# Patient Record
Sex: FEMALE | Race: WHITE | NOT HISPANIC OR LATINO | URBAN - NONMETROPOLITAN AREA
[De-identification: names, ages, dates, MRNs, and addresses within clinical notes are randomized per-mention and may not be internally consistent; named-entity substitution may affect disease eponyms.]

---

## 2017-02-01 ENCOUNTER — DOCTOR'S OFFICE (OUTPATIENT)
Dept: URBAN - NONMETROPOLITAN AREA CLINIC 1 | Facility: CLINIC | Age: 52
Setting detail: OPHTHALMOLOGY
End: 2017-02-01
Payer: COMMERCIAL

## 2017-02-01 DIAGNOSIS — H43.11: ICD-10-CM

## 2017-02-01 DIAGNOSIS — H33.301: ICD-10-CM

## 2017-02-01 DIAGNOSIS — H33.021: ICD-10-CM

## 2017-02-01 DIAGNOSIS — H35.413: ICD-10-CM

## 2017-02-01 DIAGNOSIS — H53.19: ICD-10-CM

## 2017-02-01 DIAGNOSIS — H43.813: ICD-10-CM

## 2017-02-01 PROCEDURE — 92014 COMPRE OPH EXAM EST PT 1/>: CPT | Performed by: OPHTHALMOLOGY

## 2017-02-01 ASSESSMENT — REFRACTION_MANIFEST
OD_VA1: 20/
OS_VA3: 20/
OU_VA: 20/
OD_VA1: 20/
OS_VA1: 20/
OS_VA2: 20/
OS_VA1: 20/
OU_VA: 20/
OD_VA2: 20/
OD_VA3: 20/
OD_VA3: 20/
OD_VA2: 20/
OD_VA3: 20/
OS_VA3: 20/
OD_VA1: 20/
OS_VA2: 20/
OS_VA1: 20/
OS_VA2: 20/
OD_VA2: 20/
OU_VA: 20/
OS_VA3: 20/

## 2017-02-01 ASSESSMENT — REFRACTION_AUTOREFRACTION
OD_AXIS: 120
OD_SPHERE: -4.50
OS_AXIS: 94
OS_CYLINDER: -0.50
OS_SPHERE: -3.25
OD_CYLINDER: -0.25

## 2017-02-01 ASSESSMENT — REFRACTION_CURRENTRX
OD_OVR_VA: 20/
OD_OVR_VA: 20/
OS_OVR_VA: 20/
OD_OVR_VA: 20/
OS_OVR_VA: 20/
OS_OVR_VA: 20/

## 2017-02-01 ASSESSMENT — VISUAL ACUITY
OD_BCVA: 20/25
OS_BCVA: 20/25

## 2017-02-01 ASSESSMENT — CONFRONTATIONAL VISUAL FIELD TEST (CVF)
OS_FINDINGS: FULL
OD_FINDINGS: FULL

## 2017-02-01 ASSESSMENT — SPHEQUIV_DERIVED
OD_SPHEQUIV: -4.625
OS_SPHEQUIV: -3.5

## 2017-04-12 ENCOUNTER — DOCTOR'S OFFICE (OUTPATIENT)
Dept: URBAN - NONMETROPOLITAN AREA CLINIC 1 | Facility: CLINIC | Age: 52
Setting detail: OPHTHALMOLOGY
End: 2017-04-12
Payer: COMMERCIAL

## 2017-04-12 DIAGNOSIS — H52.13: ICD-10-CM

## 2017-04-12 DIAGNOSIS — H52.4: ICD-10-CM

## 2017-04-12 PROCEDURE — 92015 DETERMINE REFRACTIVE STATE: CPT | Performed by: OPTOMETRIST

## 2017-04-12 ASSESSMENT — REFRACTION_OUTSIDERX
OD_SPHERE: -2.75
OS_VA2: 20/20-2
OD_CYLINDER: SPH
OU_VA: 20/
OD_ADD: +1.25
OS_ADD: +1.25
OS_VA3: 20/
OD_CYLINDER: SPH
OD_VA1: 20/25
OS_SPHERE: -1.50
OS_SPHERE: -2.75
OS_AXIS: 090
OS_VA3: 20/
OS_VA2: 20/
OD_VA1: 20/
OS_VA1: 20/
OD_VA3: 20/
OD_VA2: 20/
OD_SPHERE: -4.00
OS_CYLINDER: -0.50
OS_VA1: 20/20-2
OS_CYLINDER: -0.50
OD_ADD: +2.50
OS_ADD: +2.50
OD_VA2: 20/25
OD_VA3: 20/
OS_AXIS: 090
OU_VA: 20/

## 2017-04-12 ASSESSMENT — REFRACTION_CURRENTRX
OS_AXIS: 116
OD_OVR_VA: 20/
OS_SPHERE: -2.75
OD_ADD: +1.25
OD_OVR_VA: 20/
OS_OVR_VA: 20/
OD_SPHERE: -4.00
OS_OVR_VA: 20/
OS_OVR_VA: 20/
OS_ADD: +1.25
OS_CYLINDER: -0.50
OD_OVR_VA: 20/
OD_AXIS: 045
OD_CYLINDER: -0.50

## 2017-04-12 ASSESSMENT — REFRACTION_MANIFEST
OD_VA3: 20/
OD_VA2: 20/
OU_VA: 20/
OS_VA3: 20/
OS_VA2: 20/
OS_VA1: 20/
OD_VA1: 20/

## 2017-04-12 ASSESSMENT — REFRACTION_AUTOREFRACTION
OD_AXIS: 000
OS_CYLINDER: -0.50
OS_SPHERE: -2.75
OS_AXIS: 088
OD_SPHERE: -4.00
OD_CYLINDER: 0.00

## 2017-04-12 ASSESSMENT — SPHEQUIV_DERIVED
OD_SPHEQUIV: -4
OS_SPHEQUIV: -3

## 2017-04-12 ASSESSMENT — VISUAL ACUITY
OD_BCVA: 20/20-1
OS_BCVA: 20/25-1

## 2017-05-03 ENCOUNTER — DOCTOR'S OFFICE (OUTPATIENT)
Dept: URBAN - NONMETROPOLITAN AREA CLINIC 1 | Facility: CLINIC | Age: 52
Setting detail: OPHTHALMOLOGY
End: 2017-05-03
Payer: COMMERCIAL

## 2017-05-03 DIAGNOSIS — H33.301: ICD-10-CM

## 2017-05-03 DIAGNOSIS — H35.413: ICD-10-CM

## 2017-05-03 DIAGNOSIS — H25.13: ICD-10-CM

## 2017-05-03 DIAGNOSIS — H33.021: ICD-10-CM

## 2017-05-03 DIAGNOSIS — H43.813: ICD-10-CM

## 2017-05-03 DIAGNOSIS — H43.11: ICD-10-CM

## 2017-05-03 DIAGNOSIS — H53.19: ICD-10-CM

## 2017-05-03 PROCEDURE — 92014 COMPRE OPH EXAM EST PT 1/>: CPT | Performed by: OPHTHALMOLOGY

## 2017-05-03 ASSESSMENT — REFRACTION_OUTSIDERX
OD_CYLINDER: SPH
OS_AXIS: 090
OD_ADD: +2.50
OS_VA1: 20/
OD_VA1: 20/
OS_ADD: +2.50
OD_VA3: 20/
OD_VA2: 20/
OS_VA3: 20/
OS_VA2: 20/20-2
OD_SPHERE: -2.75
OS_VA3: 20/
OS_SPHERE: -2.75
OD_SPHERE: -4.00
OS_SPHERE: -1.50
OS_ADD: +1.25
OD_ADD: +1.25
OS_VA2: 20/
OU_VA: 20/
OD_VA2: 20/25
OS_CYLINDER: -0.50
OS_VA1: 20/20-2
OS_CYLINDER: -0.50
OU_VA: 20/
OS_AXIS: 090
OD_VA1: 20/25
OD_CYLINDER: SPH
OD_VA3: 20/

## 2017-05-03 ASSESSMENT — REFRACTION_AUTOREFRACTION
OS_SPHERE: -2.75
OD_SPHERE: -4.00
OD_CYLINDER: 0.00
OS_AXIS: 088
OS_CYLINDER: -0.50
OD_AXIS: 000

## 2017-05-03 ASSESSMENT — CONFRONTATIONAL VISUAL FIELD TEST (CVF)
OD_FINDINGS: FULL
OS_FINDINGS: FULL

## 2017-05-03 ASSESSMENT — REFRACTION_MANIFEST
OD_VA1: 20/
OD_VA3: 20/
OS_VA2: 20/
OD_VA2: 20/
OS_VA3: 20/
OU_VA: 20/
OS_VA1: 20/

## 2017-05-03 ASSESSMENT — REFRACTION_CURRENTRX
OD_OVR_VA: 20/
OD_OVR_VA: 20/
OD_ADD: +1.25
OD_CYLINDER: -0.50
OD_SPHERE: -4.00
OS_OVR_VA: 20/
OD_OVR_VA: 20/
OS_OVR_VA: 20/
OS_AXIS: 116
OS_OVR_VA: 20/
OS_ADD: +1.25
OS_SPHERE: -2.75
OS_CYLINDER: -0.50
OD_AXIS: 045

## 2017-05-03 ASSESSMENT — VISUAL ACUITY
OS_BCVA: 20/25-1
OD_BCVA: 20/20-1

## 2017-05-03 ASSESSMENT — SPHEQUIV_DERIVED
OD_SPHEQUIV: -4
OS_SPHEQUIV: -3

## 2017-08-24 ENCOUNTER — OPTICAL OFFICE (OUTPATIENT)
Dept: URBAN - NONMETROPOLITAN AREA CLINIC 4 | Facility: CLINIC | Age: 52
Setting detail: OPHTHALMOLOGY
End: 2017-08-24
Payer: COMMERCIAL

## 2017-08-24 DIAGNOSIS — H52.13: ICD-10-CM

## 2017-08-24 PROCEDURE — V2020 VISION SVCS FRAMES PURCHASES: HCPCS | Performed by: OPTOMETRIST

## 2017-08-24 PROCEDURE — V2781 PROGRESSIVE LENS PER LENS: HCPCS | Performed by: OPTOMETRIST

## 2017-08-24 PROCEDURE — V2025 EYEGLASSES DELUX FRAMES: HCPCS | Performed by: OPTOMETRIST

## 2017-08-24 PROCEDURE — V2784 LENS POLYCARB OR EQUAL: HCPCS | Performed by: OPTOMETRIST

## 2017-08-24 PROCEDURE — V2744 TINT PHOTOCHROMATIC LENS/ES: HCPCS | Performed by: OPTOMETRIST

## 2017-08-24 PROCEDURE — V2203 LENS SPHCYL BIFOCAL 4.00D/.1: HCPCS | Performed by: OPTOMETRIST

## 2017-08-24 PROCEDURE — V2750 ANTI-REFLECTIVE COATING: HCPCS | Performed by: OPTOMETRIST

## 2017-10-02 ENCOUNTER — DOCTOR'S OFFICE (OUTPATIENT)
Dept: URBAN - NONMETROPOLITAN AREA CLINIC 1 | Facility: CLINIC | Age: 52
Setting detail: OPHTHALMOLOGY
End: 2017-10-02
Payer: COMMERCIAL

## 2017-10-02 DIAGNOSIS — H43.811: ICD-10-CM

## 2017-10-02 DIAGNOSIS — H43.812: ICD-10-CM

## 2017-10-02 DIAGNOSIS — H53.19: ICD-10-CM

## 2017-10-02 DIAGNOSIS — H35.413: ICD-10-CM

## 2017-10-02 DIAGNOSIS — H43.813: ICD-10-CM

## 2017-10-02 DIAGNOSIS — H33.021: ICD-10-CM

## 2017-10-02 DIAGNOSIS — H33.301: ICD-10-CM

## 2017-10-02 PROBLEM — H43.11: Status: RESOLVED | Noted: 2017-02-01 | Resolved: 2017-10-02

## 2017-10-02 PROCEDURE — 92134 CPTRZ OPH DX IMG PST SGM RTA: CPT | Performed by: OPHTHALMOLOGY

## 2017-10-02 PROCEDURE — 92226 OPHTHALMOSCOPY EXT SUBSEQUENT: CPT | Performed by: OPHTHALMOLOGY

## 2017-10-02 PROCEDURE — 92014 COMPRE OPH EXAM EST PT 1/>: CPT | Performed by: OPHTHALMOLOGY

## 2017-10-02 ASSESSMENT — REFRACTION_OUTSIDERX
OS_ADD: +1.25
OS_VA1: 20/20-2
OD_VA2: 20/25
OS_CYLINDER: -0.50
OD_SPHERE: -4.00
OD_VA1: 20/25
OD_VA3: 20/
OD_CYLINDER: SPH
OD_VA1: 20/
OS_VA3: 20/
OD_ADD: +2.50
OS_VA3: 20/
OU_VA: 20/
OS_VA1: 20/
OS_SPHERE: -1.50
OD_VA3: 20/
OD_CYLINDER: SPH
OS_SPHERE: -2.75
OS_AXIS: 090
OD_VA2: 20/
OD_ADD: +1.25
OS_CYLINDER: -0.50
OS_VA2: 20/
OS_AXIS: 090
OD_SPHERE: -2.75
OU_VA: 20/
OS_VA2: 20/20-2
OS_ADD: +2.50

## 2017-10-02 ASSESSMENT — CONFRONTATIONAL VISUAL FIELD TEST (CVF)
OS_FINDINGS: FULL
OD_FINDINGS: FULL

## 2017-10-02 ASSESSMENT — REFRACTION_CURRENTRX
OD_ADD: +1.25
OS_AXIS: 116
OS_ADD: +1.25
OS_OVR_VA: 20/
OS_CYLINDER: -0.50
OS_OVR_VA: 20/
OD_SPHERE: -4.00
OS_SPHERE: -2.75
OS_OVR_VA: 20/
OD_AXIS: 045
OD_OVR_VA: 20/
OD_OVR_VA: 20/
OD_CYLINDER: -0.50
OD_OVR_VA: 20/

## 2017-10-02 ASSESSMENT — REFRACTION_MANIFEST
OD_VA2: 20/
OU_VA: 20/
OS_VA2: 20/
OS_VA3: 20/
OD_VA3: 20/
OS_VA1: 20/
OD_VA1: 20/

## 2017-10-02 ASSESSMENT — VISUAL ACUITY
OD_BCVA: 20/30
OS_BCVA: 20/25

## 2017-10-02 ASSESSMENT — REFRACTION_AUTOREFRACTION
OD_CYLINDER: 0.00
OS_AXIS: 088
OS_SPHERE: -2.75
OD_AXIS: 000
OD_SPHERE: -4.00
OS_CYLINDER: -0.50

## 2017-10-02 ASSESSMENT — SPHEQUIV_DERIVED
OD_SPHEQUIV: -4
OS_SPHEQUIV: -3

## 2017-10-11 ENCOUNTER — DOCTOR'S OFFICE (OUTPATIENT)
Dept: URBAN - NONMETROPOLITAN AREA CLINIC 1 | Facility: CLINIC | Age: 52
Setting detail: OPHTHALMOLOGY
End: 2017-10-11
Payer: COMMERCIAL

## 2017-10-11 DIAGNOSIS — H52.13: ICD-10-CM

## 2017-10-11 DIAGNOSIS — H52.4: ICD-10-CM

## 2017-10-11 PROCEDURE — 92015 DETERMINE REFRACTIVE STATE: CPT | Performed by: OPTOMETRIST

## 2017-10-11 PROCEDURE — 92310 CONTACT LENS FITTING OU: CPT | Performed by: OPTOMETRIST

## 2017-10-11 ASSESSMENT — REFRACTION_CURRENTRX
OS_ADD: +1.25
OD_AXIS: 41
OS_ADD: +2.50
OD_OVR_VA: 20/
OD_OVR_VA: 20/
OD_AXIS: 93
OD_ADD: +2.50
OS_SPHERE: -2.75
OS_AXIS: 86
OD_ADD: +1.25
OD_VPRISM_DIRECTION: PROGS
OD_CYLINDER: -0.25
OS_VPRISM_DIRECTION: PROGS
OD_VPRISM_DIRECTION: PROGS
OS_OVR_VA: 20/
OD_OVR_VA: 20/
OD_SPHERE: -4.50
OD_CYLINDER: -0.25
OS_SPHERE: -3.00
OS_CYLINDER: -0.50
OS_OVR_VA: 20/
OS_OVR_VA: 20/
OS_CYLINDER: -0.75
OD_SPHERE: -4.00
OS_VPRISM_DIRECTION: PROGS
OS_AXIS: 98

## 2017-10-11 ASSESSMENT — REFRACTION_OUTSIDERX
OS_CYLINDER: -0.50
OS_AXIS: 090
OS_AXIS: 090
OD_CYLINDER: SPH
OU_VA: 20/
OS_SPHERE: -1.50
OS_ADD: +2.50
OD_VA2: 20/25
OS_VA1: 20/
OD_ADD: +1.25
OD_CYLINDER: SPH
OD_ADD: +2.50
OD_VA1: 20/
OD_VA2: 20/
OD_SPHERE: -2.75
OS_VA3: 20/
OD_VA1: 20/25
OD_VA3: 20/
OD_VA3: 20/
OS_SPHERE: -2.75
OS_ADD: +1.25
OD_SPHERE: -4.00
OS_VA2: 20/20-2
OS_VA1: 20/20-2
OS_CYLINDER: -0.50
OS_VA2: 20/
OU_VA: 20/
OS_VA3: 20/

## 2017-10-11 ASSESSMENT — CONFRONTATIONAL VISUAL FIELD TEST (CVF)
OS_FINDINGS: FULL
OD_FINDINGS: FULL

## 2017-10-11 ASSESSMENT — VISUAL ACUITY
OS_BCVA: 20/25
OD_BCVA: 20/20-1

## 2017-10-11 ASSESSMENT — REFRACTION_AUTOREFRACTION
OD_SPHERE: -4.00
OS_SPHERE: -2.75
OS_AXIS: 088
OS_CYLINDER: -0.50

## 2017-10-11 ASSESSMENT — REFRACTION_MANIFEST
OS_VA1: 20/
OD_VA3: 20/
OS_VA2: 20/
OS_VA3: 20/
OU_VA: 20/
OD_VA1: 20/
OD_VA2: 20/

## 2017-10-11 ASSESSMENT — SPHEQUIV_DERIVED: OS_SPHEQUIV: -3

## 2017-12-04 ENCOUNTER — DOCTOR'S OFFICE (OUTPATIENT)
Dept: URBAN - NONMETROPOLITAN AREA CLINIC 1 | Facility: CLINIC | Age: 52
Setting detail: OPHTHALMOLOGY
End: 2017-12-04
Payer: COMMERCIAL

## 2017-12-04 DIAGNOSIS — H33.021: ICD-10-CM

## 2017-12-04 DIAGNOSIS — H43.813: ICD-10-CM

## 2017-12-04 DIAGNOSIS — H35.413: ICD-10-CM

## 2017-12-04 DIAGNOSIS — H25.13: ICD-10-CM

## 2017-12-04 DIAGNOSIS — H43.811: ICD-10-CM

## 2017-12-04 DIAGNOSIS — H43.812: ICD-10-CM

## 2017-12-04 DIAGNOSIS — H33.301: ICD-10-CM

## 2017-12-04 DIAGNOSIS — H53.19: ICD-10-CM

## 2017-12-04 PROCEDURE — 92226 OPHTHALMOSCOPY EXT SUBSEQUENT: CPT | Performed by: OPHTHALMOLOGY

## 2017-12-04 PROCEDURE — 92014 COMPRE OPH EXAM EST PT 1/>: CPT | Performed by: OPHTHALMOLOGY

## 2017-12-04 PROCEDURE — 92134 CPTRZ OPH DX IMG PST SGM RTA: CPT | Performed by: OPHTHALMOLOGY

## 2017-12-04 ASSESSMENT — REFRACTION_CURRENTRX
OD_OVR_VA: 20/
OD_AXIS: 41
OD_AXIS: 93
OS_AXIS: 86
OD_CYLINDER: -0.25
OS_OVR_VA: 20/
OS_VPRISM_DIRECTION: PROGS
OS_OVR_VA: 20/
OS_ADD: +2.50
OS_CYLINDER: -0.50
OD_ADD: +2.50
OD_OVR_VA: 20/
OS_SPHERE: -3.00
OD_CYLINDER: -0.25
OS_OVR_VA: 20/
OS_ADD: +1.25
OD_OVR_VA: 20/
OS_VPRISM_DIRECTION: PROGS
OD_SPHERE: -4.00
OS_SPHERE: -2.75
OD_SPHERE: -4.50
OS_CYLINDER: -0.75
OD_VPRISM_DIRECTION: PROGS
OS_AXIS: 98
OD_VPRISM_DIRECTION: PROGS
OD_ADD: +1.25

## 2017-12-04 ASSESSMENT — CONFRONTATIONAL VISUAL FIELD TEST (CVF)
OD_FINDINGS: FULL
OS_FINDINGS: FULL

## 2017-12-04 ASSESSMENT — REFRACTION_OUTSIDERX
OU_VA: 20/
OS_AXIS: 090
OD_SPHERE: -4.00
OD_VA1: 20/
OD_SPHERE: -2.75
OD_CYLINDER: SPH
OS_VA2: 20/
OD_VA3: 20/
OS_ADD: +1.25
OS_CYLINDER: -0.50
OD_ADD: +2.50
OD_VA3: 20/
OD_CYLINDER: SPH
OS_AXIS: 090
OS_SPHERE: -1.50
OS_VA3: 20/
OD_ADD: +1.25
OS_VA2: 20/20-2
OD_VA1: 20/25
OS_SPHERE: -2.75
OS_ADD: +2.50
OS_VA1: 20/20-2
OD_VA2: 20/
OS_VA3: 20/
OU_VA: 20/
OS_CYLINDER: -0.50
OD_VA2: 20/25
OS_VA1: 20/

## 2017-12-04 ASSESSMENT — REFRACTION_MANIFEST
OS_VA2: 20/
OD_VA3: 20/
OS_VA1: 20/
OD_VA2: 20/
OD_VA1: 20/
OU_VA: 20/
OS_VA3: 20/

## 2017-12-04 ASSESSMENT — REFRACTION_AUTOREFRACTION
OS_CYLINDER: -0.50
OS_AXIS: 088
OS_SPHERE: -2.75
OD_SPHERE: -4.00

## 2017-12-04 ASSESSMENT — VISUAL ACUITY
OS_BCVA: 20/30+1
OD_BCVA: 20/25+1

## 2017-12-04 ASSESSMENT — SPHEQUIV_DERIVED: OS_SPHEQUIV: -3

## 2018-02-05 ENCOUNTER — DOCTOR'S OFFICE (OUTPATIENT)
Dept: URBAN - NONMETROPOLITAN AREA CLINIC 1 | Facility: CLINIC | Age: 53
Setting detail: OPHTHALMOLOGY
End: 2018-02-05
Payer: COMMERCIAL

## 2018-02-05 DIAGNOSIS — H43.811: ICD-10-CM

## 2018-02-05 DIAGNOSIS — H04.123: ICD-10-CM

## 2018-02-05 DIAGNOSIS — H25.13: ICD-10-CM

## 2018-02-05 DIAGNOSIS — H35.413: ICD-10-CM

## 2018-02-05 DIAGNOSIS — H33.021: ICD-10-CM

## 2018-02-05 DIAGNOSIS — H43.812: ICD-10-CM

## 2018-02-05 DIAGNOSIS — H53.19: ICD-10-CM

## 2018-02-05 DIAGNOSIS — H33.301: ICD-10-CM

## 2018-02-05 DIAGNOSIS — H43.813: ICD-10-CM

## 2018-02-05 PROCEDURE — 92226 OPHTHALMOSCOPY EXT SUBSEQUENT: CPT | Performed by: OPHTHALMOLOGY

## 2018-02-05 PROCEDURE — 92014 COMPRE OPH EXAM EST PT 1/>: CPT | Performed by: OPHTHALMOLOGY

## 2018-02-05 PROCEDURE — 92134 CPTRZ OPH DX IMG PST SGM RTA: CPT | Performed by: OPHTHALMOLOGY

## 2018-02-05 ASSESSMENT — SPHEQUIV_DERIVED: OS_SPHEQUIV: -3

## 2018-02-05 ASSESSMENT — REFRACTION_OUTSIDERX
OD_CYLINDER: SPH
OD_ADD: +1.25
OU_VA: 20/
OD_VA3: 20/
OS_AXIS: 090
OD_CYLINDER: SPH
OS_VA1: 20/
OS_VA1: 20/20-2
OS_CYLINDER: -0.50
OS_ADD: +1.25
OS_VA2: 20/20-2
OS_VA2: 20/
OD_VA2: 20/
OS_AXIS: 090
OS_VA3: 20/
OU_VA: 20/
OD_VA3: 20/
OS_VA3: 20/
OD_VA2: 20/25
OS_CYLINDER: -0.50
OS_SPHERE: -1.50
OS_ADD: +2.50
OD_ADD: +2.50
OD_SPHERE: -4.00
OD_SPHERE: -2.75
OD_VA1: 20/
OS_SPHERE: -2.75
OD_VA1: 20/25

## 2018-02-05 ASSESSMENT — REFRACTION_CURRENTRX
OD_CYLINDER: -0.25
OS_CYLINDER: -0.75
OD_VPRISM_DIRECTION: PROGS
OS_OVR_VA: 20/
OD_OVR_VA: 20/
OD_ADD: +2.50
OS_ADD: +1.25
OS_CYLINDER: -0.50
OD_OVR_VA: 20/
OS_ADD: +2.50
OS_OVR_VA: 20/
OD_SPHERE: -4.00
OS_AXIS: 98
OS_VPRISM_DIRECTION: PROGS
OD_SPHERE: -4.50
OD_OVR_VA: 20/
OD_AXIS: 41
OD_ADD: +1.25
OS_SPHERE: -2.75
OS_AXIS: 86
OD_CYLINDER: -0.25
OD_VPRISM_DIRECTION: PROGS
OS_OVR_VA: 20/
OS_SPHERE: -3.00
OS_VPRISM_DIRECTION: PROGS
OD_AXIS: 93

## 2018-02-05 ASSESSMENT — REFRACTION_AUTOREFRACTION
OS_AXIS: 088
OS_SPHERE: -2.75
OD_SPHERE: -4.00
OS_CYLINDER: -0.50

## 2018-02-05 ASSESSMENT — REFRACTION_MANIFEST
OS_VA2: 20/
OS_VA1: 20/
OD_VA3: 20/
OU_VA: 20/
OD_VA1: 20/
OD_VA2: 20/
OS_VA3: 20/

## 2018-02-05 ASSESSMENT — DECREASING TEAR LAKE - SEVERITY SCORE
OS_DEC_TEARLAKE: 2+ 3+
OD_DEC_TEARLAKE: 2+ 3+

## 2018-02-05 ASSESSMENT — VISUAL ACUITY
OD_BCVA: 20/25
OS_BCVA: 20/25

## 2018-02-05 ASSESSMENT — CONFRONTATIONAL VISUAL FIELD TEST (CVF)
OS_FINDINGS: FULL
OD_FINDINGS: FULL

## 2018-02-20 ENCOUNTER — DOCTOR'S OFFICE (OUTPATIENT)
Dept: URBAN - NONMETROPOLITAN AREA CLINIC 2 | Facility: CLINIC | Age: 53
Setting detail: OPHTHALMOLOGY
End: 2018-02-20
Payer: COMMERCIAL

## 2018-02-20 DIAGNOSIS — H53.19: ICD-10-CM

## 2018-02-20 DIAGNOSIS — H35.373: ICD-10-CM

## 2018-02-20 DIAGNOSIS — H43.811: ICD-10-CM

## 2018-02-20 DIAGNOSIS — H43.812: ICD-10-CM

## 2018-02-20 DIAGNOSIS — H33.021: ICD-10-CM

## 2018-02-20 DIAGNOSIS — H43.813: ICD-10-CM

## 2018-02-20 DIAGNOSIS — H33.301: ICD-10-CM

## 2018-02-20 DIAGNOSIS — H35.413: ICD-10-CM

## 2018-02-20 PROCEDURE — 92014 COMPRE OPH EXAM EST PT 1/>: CPT | Performed by: OPHTHALMOLOGY

## 2018-02-20 PROCEDURE — 92226 OPHTHALMOSCOPY EXT SUBSEQUENT: CPT | Performed by: OPHTHALMOLOGY

## 2018-02-20 ASSESSMENT — REFRACTION_OUTSIDERX
OD_ADD: +1.25
OD_ADD: +2.50
OS_AXIS: 090
OU_VA: 20/
OS_AXIS: 090
OS_CYLINDER: -0.50
OD_VA2: 20/
OD_CYLINDER: SPH
OS_ADD: +1.25
OS_VA2: 20/
OS_SPHERE: -2.75
OS_VA2: 20/20-2
OD_CYLINDER: SPH
OU_VA: 20/
OS_SPHERE: -1.50
OD_VA1: 20/
OS_VA1: 20/20-2
OD_SPHERE: -2.75
OD_VA3: 20/
OD_SPHERE: -4.00
OS_VA3: 20/
OS_CYLINDER: -0.50
OS_VA3: 20/
OS_VA1: 20/
OD_VA1: 20/25
OS_ADD: +2.50
OD_VA2: 20/25
OD_VA3: 20/

## 2018-02-20 ASSESSMENT — REFRACTION_CURRENTRX
OS_OVR_VA: 20/
OD_OVR_VA: 20/
OD_OVR_VA: 20/
OD_VPRISM_DIRECTION: PROGS
OD_SPHERE: -4.00
OD_CYLINDER: -0.25
OD_OVR_VA: 20/
OD_AXIS: 41
OS_OVR_VA: 20/
OD_VPRISM_DIRECTION: PROGS
OS_SPHERE: -2.75
OD_CYLINDER: -0.25
OD_ADD: +1.25
OD_ADD: +2.50
OS_ADD: +1.25
OS_CYLINDER: -0.50
OS_CYLINDER: -0.75
OD_SPHERE: -4.50
OD_AXIS: 93
OS_ADD: +2.50
OS_SPHERE: -3.00
OS_OVR_VA: 20/
OS_VPRISM_DIRECTION: PROGS
OS_AXIS: 86
OS_VPRISM_DIRECTION: PROGS
OS_AXIS: 98

## 2018-02-20 ASSESSMENT — DECREASING TEAR LAKE - SEVERITY SCORE
OD_DEC_TEARLAKE: 2+ 3+
OS_DEC_TEARLAKE: 2+ 3+

## 2018-02-20 ASSESSMENT — REFRACTION_AUTOREFRACTION
OD_SPHERE: -4.00
OS_CYLINDER: -0.50
OS_AXIS: 088
OS_SPHERE: -2.75

## 2018-02-20 ASSESSMENT — VISUAL ACUITY
OD_BCVA: 20/20-2
OS_BCVA: 20/25+1

## 2018-02-20 ASSESSMENT — REFRACTION_MANIFEST
OS_VA3: 20/
OS_VA1: 20/
OD_VA2: 20/
OD_VA3: 20/
OS_VA2: 20/
OU_VA: 20/
OD_VA1: 20/

## 2018-02-20 ASSESSMENT — CONFRONTATIONAL VISUAL FIELD TEST (CVF)
OD_FINDINGS: FULL
OS_FINDINGS: FULL

## 2018-02-20 ASSESSMENT — SPHEQUIV_DERIVED: OS_SPHEQUIV: -3

## 2018-03-12 ENCOUNTER — DOCTOR'S OFFICE (OUTPATIENT)
Dept: URBAN - NONMETROPOLITAN AREA CLINIC 1 | Facility: CLINIC | Age: 53
Setting detail: OPHTHALMOLOGY
End: 2018-03-12
Payer: COMMERCIAL

## 2018-03-12 DIAGNOSIS — H04.122: ICD-10-CM

## 2018-03-12 DIAGNOSIS — H35.373: ICD-10-CM

## 2018-03-12 DIAGNOSIS — H43.811: ICD-10-CM

## 2018-03-12 DIAGNOSIS — H33.021: ICD-10-CM

## 2018-03-12 DIAGNOSIS — H43.812: ICD-10-CM

## 2018-03-12 DIAGNOSIS — H35.413: ICD-10-CM

## 2018-03-12 DIAGNOSIS — H04.123: ICD-10-CM

## 2018-03-12 DIAGNOSIS — H33.301: ICD-10-CM

## 2018-03-12 PROCEDURE — 92014 COMPRE OPH EXAM EST PT 1/>: CPT | Performed by: OPHTHALMOLOGY

## 2018-03-12 PROCEDURE — 92226 OPHTHALMOSCOPY EXT SUBSEQUENT: CPT | Performed by: OPHTHALMOLOGY

## 2018-03-12 PROCEDURE — 83861 MICROFLUID ANALY TEARS: CPT | Performed by: OPHTHALMOLOGY

## 2018-03-12 PROCEDURE — 92250 FUNDUS PHOTOGRAPHY W/I&R: CPT | Performed by: OPHTHALMOLOGY

## 2018-03-12 PROCEDURE — 92235 FLUORESCEIN ANGRPH MLTIFRAME: CPT | Performed by: OPHTHALMOLOGY

## 2018-03-12 ASSESSMENT — REFRACTION_AUTOREFRACTION
OS_CYLINDER: -0.50
OS_AXIS: 088
OD_SPHERE: -4.00
OS_SPHERE: -2.75

## 2018-03-12 ASSESSMENT — REFRACTION_CURRENTRX
OD_OVR_VA: 20/
OD_CYLINDER: -0.25
OS_OVR_VA: 20/
OS_CYLINDER: -0.75
OD_OVR_VA: 20/
OS_OVR_VA: 20/
OS_CYLINDER: -0.50
OS_SPHERE: -3.00
OS_OVR_VA: 20/
OD_CYLINDER: -0.25
OD_SPHERE: -4.50
OS_AXIS: 98
OD_AXIS: 93
OD_ADD: +2.50
OS_ADD: +1.25
OS_SPHERE: -2.75
OS_ADD: +2.50
OD_AXIS: 41
OD_OVR_VA: 20/
OS_VPRISM_DIRECTION: PROGS
OS_AXIS: 86
OD_VPRISM_DIRECTION: PROGS
OD_ADD: +1.25
OD_SPHERE: -4.00
OS_VPRISM_DIRECTION: PROGS
OD_VPRISM_DIRECTION: PROGS

## 2018-03-12 ASSESSMENT — REFRACTION_OUTSIDERX
OD_SPHERE: -2.75
OD_SPHERE: -4.00
OD_VA3: 20/
OS_SPHERE: -1.50
OS_VA2: 20/
OS_VA3: 20/
OS_VA1: 20/
OD_CYLINDER: SPH
OD_CYLINDER: SPH
OS_ADD: +1.25
OD_ADD: +1.25
OS_ADD: +2.50
OU_VA: 20/
OD_ADD: +2.50
OD_VA1: 20/
OD_VA1: 20/25
OD_VA2: 20/25
OD_VA3: 20/
OS_SPHERE: -2.75
OS_VA1: 20/20-2
OS_AXIS: 090
OU_VA: 20/
OD_VA2: 20/
OS_VA2: 20/20-2
OS_AXIS: 090
OS_VA3: 20/
OS_CYLINDER: -0.50
OS_CYLINDER: -0.50

## 2018-03-12 ASSESSMENT — DECREASING TEAR LAKE - SEVERITY SCORE
OD_DEC_TEARLAKE: 2+ 3+
OS_DEC_TEARLAKE: 2+ 3+

## 2018-03-12 ASSESSMENT — REFRACTION_MANIFEST
OD_VA3: 20/
OS_VA3: 20/
OU_VA: 20/
OD_VA2: 20/
OS_VA2: 20/
OD_VA1: 20/
OS_VA1: 20/

## 2018-03-12 ASSESSMENT — CONFRONTATIONAL VISUAL FIELD TEST (CVF)
OS_FINDINGS: FULL
OD_FINDINGS: FULL

## 2018-03-12 ASSESSMENT — VISUAL ACUITY
OD_BCVA: 20/20
OS_BCVA: 20/25+2

## 2018-03-12 ASSESSMENT — SPHEQUIV_DERIVED: OS_SPHEQUIV: -3

## 2018-04-10 ENCOUNTER — DOCTOR'S OFFICE (OUTPATIENT)
Dept: URBAN - NONMETROPOLITAN AREA CLINIC 2 | Facility: CLINIC | Age: 53
Setting detail: OPHTHALMOLOGY
End: 2018-04-10
Payer: COMMERCIAL

## 2018-04-10 DIAGNOSIS — H33.312: ICD-10-CM

## 2018-04-10 PROCEDURE — 67145 PROPH RTA DTCHMNT PC: CPT | Performed by: OPHTHALMOLOGY

## 2018-04-10 PROCEDURE — 92014 COMPRE OPH EXAM EST PT 1/>: CPT | Performed by: OPHTHALMOLOGY

## 2018-04-10 PROCEDURE — 92226 OPHTHALMOSCOPY EXT SUBSEQUENT: CPT | Performed by: OPHTHALMOLOGY

## 2018-04-10 ASSESSMENT — CONFRONTATIONAL VISUAL FIELD TEST (CVF)
OS_FINDINGS: FULL
OD_FINDINGS: FULL

## 2018-04-10 ASSESSMENT — REFRACTION_AUTOREFRACTION
OS_CYLINDER: -0.50
OS_AXIS: 088
OS_SPHERE: -2.75
OD_SPHERE: -4.00

## 2018-04-10 ASSESSMENT — REFRACTION_OUTSIDERX
OS_CYLINDER: -0.50
OD_SPHERE: -4.00
OS_SPHERE: -2.75
OS_SPHERE: -1.50
OD_ADD: +1.25
OS_AXIS: 090
OS_VA1: 20/20-2
OS_VA2: 20/
OU_VA: 20/
OS_VA3: 20/
OD_SPHERE: -2.75
OD_VA2: 20/
OD_VA3: 20/
OS_AXIS: 090
OD_VA3: 20/
OD_VA1: 20/
OU_VA: 20/
OS_VA1: 20/
OD_CYLINDER: SPH
OD_ADD: +2.50
OS_VA2: 20/20-2
OD_VA1: 20/25
OS_VA3: 20/
OS_ADD: +2.50
OS_CYLINDER: -0.50
OD_CYLINDER: SPH
OS_ADD: +1.25
OD_VA2: 20/25

## 2018-04-10 ASSESSMENT — REFRACTION_CURRENTRX
OD_OVR_VA: 20/
OD_OVR_VA: 20/
OS_OVR_VA: 20/
OS_OVR_VA: 20/
OS_AXIS: 98
OS_AXIS: 86
OS_VPRISM_DIRECTION: PROGS
OS_ADD: +1.25
OD_CYLINDER: -0.25
OD_CYLINDER: -0.25
OS_SPHERE: -3.00
OD_ADD: +2.50
OS_VPRISM_DIRECTION: PROGS
OS_SPHERE: -2.75
OD_AXIS: 41
OS_ADD: +2.50
OS_CYLINDER: -0.75
OD_ADD: +1.25
OS_CYLINDER: -0.50
OD_OVR_VA: 20/
OD_VPRISM_DIRECTION: PROGS
OS_OVR_VA: 20/
OD_SPHERE: -4.00
OD_SPHERE: -4.50
OD_AXIS: 93
OD_VPRISM_DIRECTION: PROGS

## 2018-04-10 ASSESSMENT — REFRACTION_MANIFEST
OS_VA1: 20/
OD_VA3: 20/
OS_VA2: 20/
OD_VA2: 20/
OD_VA1: 20/
OU_VA: 20/
OS_VA3: 20/

## 2018-04-10 ASSESSMENT — DECREASING TEAR LAKE - SEVERITY SCORE
OS_DEC_TEARLAKE: 2+ 3+
OD_DEC_TEARLAKE: 2+ 3+

## 2018-04-10 ASSESSMENT — VISUAL ACUITY
OS_BCVA: 20/25+1
OD_BCVA: 20/20-2

## 2018-04-10 ASSESSMENT — SPHEQUIV_DERIVED: OS_SPHEQUIV: -3

## 2018-04-11 ENCOUNTER — DOCTOR'S OFFICE (OUTPATIENT)
Dept: URBAN - METROPOLITAN AREA CLINIC 136 | Facility: CLINIC | Age: 53
Setting detail: OPHTHALMOLOGY
End: 2018-04-11
Payer: COMMERCIAL

## 2018-04-11 DIAGNOSIS — H25.043: ICD-10-CM

## 2018-04-11 DIAGNOSIS — H43.813: ICD-10-CM

## 2018-04-11 DIAGNOSIS — H35.373: ICD-10-CM

## 2018-04-11 DIAGNOSIS — H35.413: ICD-10-CM

## 2018-04-11 DIAGNOSIS — H33.022: ICD-10-CM

## 2018-04-11 PROCEDURE — 67110 REPAIR DETACHED RETINA: CPT | Performed by: OPHTHALMOLOGY

## 2018-04-11 PROCEDURE — 92014 COMPRE OPH EXAM EST PT 1/>: CPT | Performed by: OPHTHALMOLOGY

## 2018-04-11 ASSESSMENT — CONFRONTATIONAL VISUAL FIELD TEST (CVF)
OD_FINDINGS: FULL
OS_FINDINGS: FULL

## 2018-04-11 ASSESSMENT — DECREASING TEAR LAKE - SEVERITY SCORE
OD_DEC_TEARLAKE: 2+ 3+
OS_DEC_TEARLAKE: 2+ 3+

## 2018-04-12 ENCOUNTER — RX ONLY (RX ONLY)
Age: 53
End: 2018-04-12

## 2018-04-12 ENCOUNTER — DOCTOR'S OFFICE (OUTPATIENT)
Dept: URBAN - METROPOLITAN AREA CLINIC 136 | Facility: CLINIC | Age: 53
Setting detail: OPHTHALMOLOGY
End: 2018-04-12
Payer: COMMERCIAL

## 2018-04-12 DIAGNOSIS — H33.022: ICD-10-CM

## 2018-04-12 PROCEDURE — 67105 REPAIR DETACHED RETINA PC: CPT | Performed by: OPHTHALMOLOGY

## 2018-04-12 ASSESSMENT — REFRACTION_AUTOREFRACTION
OD_SPHERE: -4.00
OS_AXIS: 088
OS_AXIS: 088
OS_CYLINDER: -0.50
OS_SPHERE: -2.75
OD_SPHERE: -4.00
OS_CYLINDER: -0.50
OS_SPHERE: -2.75

## 2018-04-12 ASSESSMENT — REFRACTION_CURRENTRX
OS_VPRISM_DIRECTION: PROGS
OD_OVR_VA: 20/
OS_CYLINDER: -0.75
OD_CYLINDER: -0.25
OS_ADD: +2.50
OS_ADD: +2.50
OS_SPHERE: -3.00
OS_VPRISM_DIRECTION: PROGS
OD_AXIS: 41
OD_AXIS: 93
OS_VPRISM_DIRECTION: PROGS
OD_CYLINDER: -0.25
OD_ADD: +1.25
OD_OVR_VA: 20/
OD_OVR_VA: 20/
OS_OVR_VA: 20/
OD_SPHERE: -4.50
OD_CYLINDER: -0.25
OS_AXIS: 98
OS_CYLINDER: -0.75
OS_VPRISM_DIRECTION: PROGS
OD_AXIS: 41
OS_OVR_VA: 20/
OD_AXIS: 93
OS_OVR_VA: 20/
OS_SPHERE: -2.75
OD_VPRISM_DIRECTION: PROGS
OD_OVR_VA: 20/
OD_VPRISM_DIRECTION: PROGS
OD_OVR_VA: 20/
OD_ADD: +2.50
OD_SPHERE: -4.00
OS_SPHERE: -3.00
OS_OVR_VA: 20/
OS_AXIS: 86
OD_ADD: +2.50
OD_ADD: +1.25
OS_AXIS: 86
OD_OVR_VA: 20/
OD_VPRISM_DIRECTION: PROGS
OD_SPHERE: -4.00
OS_AXIS: 98
OS_ADD: +1.25
OD_SPHERE: -4.50
OS_CYLINDER: -0.50
OD_CYLINDER: -0.25
OD_VPRISM_DIRECTION: PROGS
OS_ADD: +1.25
OS_SPHERE: -2.75
OS_OVR_VA: 20/
OS_CYLINDER: -0.50
OS_OVR_VA: 20/

## 2018-04-12 ASSESSMENT — REFRACTION_OUTSIDERX
OD_VA3: 20/
OS_AXIS: 090
OU_VA: 20/
OS_ADD: +1.25
OD_ADD: +2.50
OS_AXIS: 090
OS_VA2: 20/20-2
OS_CYLINDER: -0.50
OS_SPHERE: -1.50
OS_VA2: 20/20-2
OU_VA: 20/
OD_VA1: 20/
OS_AXIS: 090
OD_SPHERE: -4.00
OS_VA2: 20/
OS_VA1: 20/
OD_VA2: 20/
OS_VA2: 20/
OS_VA3: 20/
OS_SPHERE: -1.50
OS_VA1: 20/20-2
OS_ADD: +2.50
OS_ADD: +1.25
OU_VA: 20/
OS_SPHERE: -2.75
OS_AXIS: 090
OD_VA1: 20/25
OS_ADD: +2.50
OD_VA2: 20/
OS_VA1: 20/20-2
OD_ADD: +1.25
OS_CYLINDER: -0.50
OD_VA3: 20/
OS_VA3: 20/
OD_VA2: 20/25
OS_VA3: 20/
OD_CYLINDER: SPH
OD_CYLINDER: SPH
OD_ADD: +2.50
OS_CYLINDER: -0.50
OD_VA3: 20/
OD_CYLINDER: SPH
OD_VA1: 20/
OU_VA: 20/
OD_VA2: 20/25
OS_CYLINDER: -0.50
OD_SPHERE: -4.00
OS_VA3: 20/
OD_VA1: 20/25
OD_VA3: 20/
OS_SPHERE: -2.75
OD_ADD: +1.25
OS_VA1: 20/
OD_SPHERE: -2.75
OD_CYLINDER: SPH
OD_SPHERE: -2.75

## 2018-04-12 ASSESSMENT — REFRACTION_MANIFEST
OS_VA3: 20/
OD_VA3: 20/
OD_VA2: 20/
OD_VA2: 20/
OU_VA: 20/
OU_VA: 20/
OS_VA2: 20/
OS_VA3: 20/
OS_VA1: 20/
OD_VA3: 20/
OS_VA1: 20/
OD_VA1: 20/
OD_VA1: 20/
OS_VA2: 20/

## 2018-04-12 ASSESSMENT — DECREASING TEAR LAKE - SEVERITY SCORE
OS_DEC_TEARLAKE: 2+ 3+
OD_DEC_TEARLAKE: 2+ 3+

## 2018-04-12 ASSESSMENT — CONFRONTATIONAL VISUAL FIELD TEST (CVF)
OS_FINDINGS: FULL
OD_FINDINGS: FULL

## 2018-04-12 ASSESSMENT — VISUAL ACUITY
OD_BCVA: 20/20
OS_BCVA: 20/20
OS_BCVA: 20/25
OD_BCVA: 20/20-2

## 2018-04-12 ASSESSMENT — SPHEQUIV_DERIVED
OS_SPHEQUIV: -3
OS_SPHEQUIV: -3

## 2018-04-17 ENCOUNTER — DOCTOR'S OFFICE (OUTPATIENT)
Dept: URBAN - METROPOLITAN AREA CLINIC 136 | Facility: CLINIC | Age: 53
Setting detail: OPHTHALMOLOGY
End: 2018-04-17
Payer: COMMERCIAL

## 2018-04-17 DIAGNOSIS — H33.021: ICD-10-CM

## 2018-04-17 DIAGNOSIS — H35.379: ICD-10-CM

## 2018-04-17 DIAGNOSIS — H33.022: ICD-10-CM

## 2018-04-17 PROCEDURE — 99024 POSTOP FOLLOW-UP VISIT: CPT | Performed by: OPHTHALMOLOGY

## 2018-04-17 ASSESSMENT — DECREASING TEAR LAKE - SEVERITY SCORE
OS_DEC_TEARLAKE: 2+ 3+
OD_DEC_TEARLAKE: 2+ 3+

## 2018-04-18 ASSESSMENT — REFRACTION_MANIFEST
OS_VA1: 20/
OU_VA: 20/
OD_VA1: 20/
OD_VA3: 20/
OS_VA3: 20/
OD_VA2: 20/
OS_VA2: 20/

## 2018-04-18 ASSESSMENT — REFRACTION_CURRENTRX
OS_AXIS: 86
OS_VPRISM_DIRECTION: PROGS
OS_OVR_VA: 20/
OS_ADD: +2.50
OS_SPHERE: -2.75
OS_CYLINDER: -0.75
OD_ADD: +2.50
OD_SPHERE: -4.50
OD_AXIS: 41
OS_ADD: +1.25
OD_AXIS: 93
OD_VPRISM_DIRECTION: PROGS
OS_VPRISM_DIRECTION: PROGS
OS_OVR_VA: 20/
OD_OVR_VA: 20/
OS_OVR_VA: 20/
OD_SPHERE: -4.00
OS_SPHERE: -3.00
OS_AXIS: 98
OD_OVR_VA: 20/
OD_VPRISM_DIRECTION: PROGS
OD_CYLINDER: -0.25
OD_CYLINDER: -0.25
OS_CYLINDER: -0.50
OD_ADD: +1.25
OD_OVR_VA: 20/

## 2018-04-18 ASSESSMENT — REFRACTION_OUTSIDERX
OS_CYLINDER: -0.50
OS_VA2: 20/
OD_VA1: 20/
OD_SPHERE: -4.00
OS_VA1: 20/20-2
OD_CYLINDER: SPH
OS_ADD: +2.50
OU_VA: 20/
OD_CYLINDER: SPH
OS_AXIS: 090
OD_VA1: 20/25
OS_VA1: 20/
OS_AXIS: 090
OS_VA3: 20/
OS_CYLINDER: -0.50
OD_SPHERE: -2.75
OD_VA3: 20/
OU_VA: 20/
OS_VA2: 20/20-2
OS_VA3: 20/
OD_VA2: 20/25
OD_ADD: +2.50
OS_SPHERE: -1.50
OS_ADD: +1.25
OD_VA3: 20/
OD_VA2: 20/
OD_ADD: +1.25
OS_SPHERE: -2.75

## 2018-04-18 ASSESSMENT — VISUAL ACUITY
OD_BCVA: 20/20
OS_BCVA: 20/20

## 2018-04-18 ASSESSMENT — SPHEQUIV_DERIVED: OS_SPHEQUIV: -3

## 2018-04-18 ASSESSMENT — REFRACTION_AUTOREFRACTION
OS_CYLINDER: -0.50
OD_SPHERE: -4.00
OS_SPHERE: -2.75
OS_AXIS: 088

## 2018-04-23 ENCOUNTER — DOCTOR'S OFFICE (OUTPATIENT)
Dept: URBAN - NONMETROPOLITAN AREA CLINIC 1 | Facility: CLINIC | Age: 53
Setting detail: OPHTHALMOLOGY
End: 2018-04-23
Payer: COMMERCIAL

## 2018-04-23 DIAGNOSIS — H33.023: ICD-10-CM

## 2018-04-23 DIAGNOSIS — H33.022: ICD-10-CM

## 2018-04-23 PROCEDURE — 76512 OPH US DX B-SCAN: CPT | Performed by: OPHTHALMOLOGY

## 2018-04-23 PROCEDURE — 99024 POSTOP FOLLOW-UP VISIT: CPT | Performed by: OPHTHALMOLOGY

## 2018-04-23 PROCEDURE — 92134 CPTRZ OPH DX IMG PST SGM RTA: CPT | Performed by: OPHTHALMOLOGY

## 2018-04-23 PROCEDURE — 92226 OPHTHALMOSCOPY EXT SUBSEQUENT: CPT | Performed by: OPHTHALMOLOGY

## 2018-04-23 ASSESSMENT — REFRACTION_OUTSIDERX
OD_VA1: 20/
OD_SPHERE: -4.00
OD_CYLINDER: SPH
OS_VA3: 20/
OD_VA2: 20/
OS_VA3: 20/
OS_ADD: +2.50
OS_VA1: 20/20-2
OS_VA2: 20/
OD_SPHERE: -2.75
OD_VA2: 20/25
OS_AXIS: 090
OS_VA2: 20/20-2
OD_ADD: +2.50
OD_VA3: 20/
OU_VA: 20/
OS_CYLINDER: -0.50
OD_VA1: 20/25
OU_VA: 20/
OS_AXIS: 090
OS_VA1: 20/
OS_CYLINDER: -0.50
OS_ADD: +1.25
OD_ADD: +1.25
OS_SPHERE: -1.50
OS_SPHERE: -2.75
OD_CYLINDER: SPH
OD_VA3: 20/

## 2018-04-23 ASSESSMENT — REFRACTION_CURRENTRX
OD_SPHERE: -4.50
OS_ADD: +1.25
OS_VPRISM_DIRECTION: PROGS
OD_OVR_VA: 20/
OS_OVR_VA: 20/
OD_VPRISM_DIRECTION: PROGS
OD_ADD: +1.25
OD_SPHERE: -4.00
OS_VPRISM_DIRECTION: PROGS
OD_ADD: +2.50
OS_ADD: +2.50
OS_CYLINDER: -0.50
OD_AXIS: 41
OS_SPHERE: -3.00
OS_OVR_VA: 20/
OD_CYLINDER: -0.25
OD_OVR_VA: 20/
OS_CYLINDER: -0.75
OD_OVR_VA: 20/
OS_OVR_VA: 20/
OS_AXIS: 86
OS_AXIS: 98
OD_AXIS: 93
OD_VPRISM_DIRECTION: PROGS
OD_CYLINDER: -0.25
OS_SPHERE: -2.75

## 2018-04-23 ASSESSMENT — REFRACTION_MANIFEST
OD_VA3: 20/
OD_VA2: 20/
OS_VA2: 20/
OS_VA3: 20/
OS_VA1: 20/
OU_VA: 20/
OD_VA1: 20/

## 2018-04-23 ASSESSMENT — DECREASING TEAR LAKE - SEVERITY SCORE
OS_DEC_TEARLAKE: 2+ 3+
OD_DEC_TEARLAKE: 2+ 3+

## 2018-04-23 ASSESSMENT — SPHEQUIV_DERIVED: OS_SPHEQUIV: -3

## 2018-04-23 ASSESSMENT — CONFRONTATIONAL VISUAL FIELD TEST (CVF)
OS_FINDINGS: FULL
OD_FINDINGS: FULL

## 2018-04-23 ASSESSMENT — REFRACTION_AUTOREFRACTION
OS_CYLINDER: -0.50
OS_SPHERE: -2.75
OD_SPHERE: -4.00
OS_AXIS: 088

## 2018-04-23 ASSESSMENT — VISUAL ACUITY
OD_BCVA: 20/25-1
OS_BCVA: 20/25

## 2018-04-26 ENCOUNTER — DOCTOR'S OFFICE (OUTPATIENT)
Dept: URBAN - METROPOLITAN AREA CLINIC 136 | Facility: CLINIC | Age: 53
Setting detail: OPHTHALMOLOGY
End: 2018-04-26
Payer: COMMERCIAL

## 2018-04-26 DIAGNOSIS — H35.411: ICD-10-CM

## 2018-04-26 DIAGNOSIS — H33.021: ICD-10-CM

## 2018-04-26 DIAGNOSIS — H43.812: ICD-10-CM

## 2018-04-26 DIAGNOSIS — H43.811: ICD-10-CM

## 2018-04-26 DIAGNOSIS — H35.412: ICD-10-CM

## 2018-04-26 DIAGNOSIS — H33.022: ICD-10-CM

## 2018-04-26 DIAGNOSIS — H35.413: ICD-10-CM

## 2018-04-26 PROCEDURE — 99024 POSTOP FOLLOW-UP VISIT: CPT | Performed by: OPHTHALMOLOGY

## 2018-04-26 ASSESSMENT — REFRACTION_CURRENTRX
OD_OVR_VA: 20/
OD_SPHERE: -4.00
OD_VPRISM_DIRECTION: PROGS
OD_VPRISM_DIRECTION: PROGS
OD_CYLINDER: -0.25
OS_SPHERE: -2.75
OD_ADD: +1.25
OD_OVR_VA: 20/
OS_CYLINDER: -0.50
OS_CYLINDER: -0.75
OD_ADD: +2.50
OS_OVR_VA: 20/
OS_SPHERE: -3.00
OS_AXIS: 98
OD_AXIS: 93
OS_ADD: +2.50
OS_VPRISM_DIRECTION: PROGS
OS_AXIS: 86
OS_VPRISM_DIRECTION: PROGS
OS_ADD: +1.25
OD_AXIS: 41
OD_CYLINDER: -0.25
OD_OVR_VA: 20/
OD_SPHERE: -4.50
OS_OVR_VA: 20/
OS_OVR_VA: 20/

## 2018-04-26 ASSESSMENT — REFRACTION_OUTSIDERX
OS_ADD: +2.50
OD_CYLINDER: SPH
OD_ADD: +2.50
OS_VA1: 20/
OS_AXIS: 090
OU_VA: 20/
OD_VA3: 20/
OD_CYLINDER: SPH
OD_VA1: 20/25
OS_VA2: 20/
OS_CYLINDER: -0.50
OS_ADD: +1.25
OD_VA1: 20/
OD_VA2: 20/25
OU_VA: 20/
OS_SPHERE: -2.75
OS_AXIS: 090
OS_VA3: 20/
OD_VA3: 20/
OS_SPHERE: -1.50
OD_SPHERE: -4.00
OS_VA2: 20/20-2
OS_VA3: 20/
OD_ADD: +1.25
OS_VA1: 20/20-2
OD_SPHERE: -2.75
OS_CYLINDER: -0.50
OD_VA2: 20/

## 2018-04-26 ASSESSMENT — REFRACTION_MANIFEST
OD_VA2: 20/
OS_VA1: 20/
OU_VA: 20/
OS_VA3: 20/
OD_VA3: 20/
OD_VA1: 20/
OS_VA2: 20/

## 2018-04-26 ASSESSMENT — CONFRONTATIONAL VISUAL FIELD TEST (CVF)
OD_FINDINGS: FULL
OS_FINDINGS: FULL

## 2018-04-26 ASSESSMENT — VISUAL ACUITY
OD_BCVA: 20/30
OS_BCVA: 20/25

## 2018-04-26 ASSESSMENT — REFRACTION_AUTOREFRACTION
OS_AXIS: 088
OS_SPHERE: -2.75
OD_SPHERE: -4.00
OS_CYLINDER: -0.50

## 2018-04-26 ASSESSMENT — DECREASING TEAR LAKE - SEVERITY SCORE
OD_DEC_TEARLAKE: 2+ 3+
OS_DEC_TEARLAKE: 2+ 3+

## 2018-04-26 ASSESSMENT — SPHEQUIV_DERIVED: OS_SPHEQUIV: -3

## 2018-05-10 ENCOUNTER — DOCTOR'S OFFICE (OUTPATIENT)
Dept: URBAN - METROPOLITAN AREA CLINIC 136 | Facility: CLINIC | Age: 53
Setting detail: OPHTHALMOLOGY
End: 2018-05-10

## 2018-05-10 DIAGNOSIS — H35.411: ICD-10-CM

## 2018-05-10 DIAGNOSIS — H35.412: ICD-10-CM

## 2018-05-10 DIAGNOSIS — H33.022: ICD-10-CM

## 2018-05-10 DIAGNOSIS — H43.812: ICD-10-CM

## 2018-05-10 DIAGNOSIS — H33.021: ICD-10-CM

## 2018-05-10 DIAGNOSIS — H35.413: ICD-10-CM

## 2018-05-10 DIAGNOSIS — H43.811: ICD-10-CM

## 2018-05-10 PROCEDURE — 99024 POSTOP FOLLOW-UP VISIT: CPT | Performed by: OPHTHALMOLOGY

## 2018-05-10 ASSESSMENT — DECREASING TEAR LAKE - SEVERITY SCORE
OS_DEC_TEARLAKE: 2+ 3+
OD_DEC_TEARLAKE: 2+ 3+

## 2018-05-10 ASSESSMENT — CONFRONTATIONAL VISUAL FIELD TEST (CVF)
OD_FINDINGS: FULL
OS_FINDINGS: FULL

## 2018-05-15 ASSESSMENT — REFRACTION_CURRENTRX
OD_SPHERE: -4.00
OS_OVR_VA: 20/
OD_OVR_VA: 20/
OS_CYLINDER: -0.75
OD_OVR_VA: 20/
OD_OVR_VA: 20/
OS_VPRISM_DIRECTION: PROGS
OS_CYLINDER: -0.50
OS_VPRISM_DIRECTION: PROGS
OS_ADD: +1.25
OS_SPHERE: -3.00
OD_SPHERE: -4.50
OD_CYLINDER: -0.25
OS_ADD: +2.50
OS_OVR_VA: 20/
OS_AXIS: 86
OD_VPRISM_DIRECTION: PROGS
OD_ADD: +2.50
OD_AXIS: 93
OS_SPHERE: -2.75
OD_CYLINDER: -0.25
OS_AXIS: 98
OD_ADD: +1.25
OD_VPRISM_DIRECTION: PROGS
OS_OVR_VA: 20/
OD_AXIS: 41

## 2018-05-15 ASSESSMENT — REFRACTION_OUTSIDERX
OS_VA3: 20/
OD_CYLINDER: SPH
OD_CYLINDER: SPH
OS_SPHERE: -2.75
OD_VA2: 20/
OD_SPHERE: -4.00
OD_VA1: 20/
OD_VA3: 20/
OS_VA1: 20/20-2
OD_VA1: 20/25
OS_CYLINDER: -0.50
OS_SPHERE: -1.50
OS_AXIS: 090
OD_ADD: +1.25
OD_VA2: 20/25
OS_VA3: 20/
OS_AXIS: 090
OS_VA1: 20/
OD_SPHERE: -2.75
OS_ADD: +2.50
OD_VA3: 20/
OU_VA: 20/
OS_CYLINDER: -0.50
OS_VA2: 20/
OS_VA2: 20/20-2
OS_ADD: +1.25
OD_ADD: +2.50
OU_VA: 20/

## 2018-05-15 ASSESSMENT — REFRACTION_MANIFEST
OU_VA: 20/
OD_VA2: 20/
OD_VA3: 20/
OS_VA1: 20/
OS_VA3: 20/
OS_VA2: 20/
OD_VA1: 20/

## 2018-05-15 ASSESSMENT — REFRACTION_AUTOREFRACTION
OS_AXIS: 088
OD_SPHERE: -4.00
OS_SPHERE: -2.75
OS_CYLINDER: -0.50

## 2018-05-15 ASSESSMENT — VISUAL ACUITY
OD_BCVA: 20/20-
OS_BCVA: 20/20

## 2018-05-15 ASSESSMENT — SPHEQUIV_DERIVED: OS_SPHEQUIV: -3

## 2018-06-07 ENCOUNTER — DOCTOR'S OFFICE (OUTPATIENT)
Dept: URBAN - METROPOLITAN AREA CLINIC 136 | Facility: CLINIC | Age: 53
Setting detail: OPHTHALMOLOGY
End: 2018-06-07
Payer: COMMERCIAL

## 2018-06-07 DIAGNOSIS — H33.022: ICD-10-CM

## 2018-06-07 DIAGNOSIS — H43.811: ICD-10-CM

## 2018-06-07 DIAGNOSIS — H43.812: ICD-10-CM

## 2018-06-07 PROCEDURE — 99024 POSTOP FOLLOW-UP VISIT: CPT | Performed by: OPHTHALMOLOGY

## 2018-06-07 ASSESSMENT — DECREASING TEAR LAKE - SEVERITY SCORE
OD_DEC_TEARLAKE: 2+ 3+
OS_DEC_TEARLAKE: 2+ 3+

## 2018-06-08 ASSESSMENT — REFRACTION_MANIFEST
OS_VA2: 20/
OD_VA2: 20/
OD_VA1: 20/
OS_VA1: 20/
OD_VA3: 20/
OU_VA: 20/
OS_VA3: 20/

## 2018-06-08 ASSESSMENT — REFRACTION_OUTSIDERX
OS_ADD: +2.50
OS_AXIS: 090
OS_VA1: 20/20-2
OS_CYLINDER: -0.50
OS_VA2: 20/
OS_VA3: 20/
OS_ADD: +1.25
OD_SPHERE: -4.00
OS_VA1: 20/
OD_VA1: 20/25
OD_ADD: +2.50
OS_SPHERE: -1.50
OD_CYLINDER: SPH
OS_CYLINDER: -0.50
OS_VA3: 20/
OU_VA: 20/
OD_CYLINDER: SPH
OD_SPHERE: -2.75
OS_SPHERE: -2.75
OD_VA3: 20/
OD_VA3: 20/
OD_VA1: 20/
OS_AXIS: 090
OD_ADD: +1.25
OS_VA2: 20/20-2
OD_VA2: 20/25
OU_VA: 20/
OD_VA2: 20/

## 2018-06-08 ASSESSMENT — REFRACTION_CURRENTRX
OD_OVR_VA: 20/
OS_VPRISM_DIRECTION: PROGS
OD_ADD: +2.50
OS_OVR_VA: 20/
OD_VPRISM_DIRECTION: PROGS
OD_VPRISM_DIRECTION: PROGS
OD_SPHERE: -4.00
OS_ADD: +2.50
OS_OVR_VA: 20/
OS_OVR_VA: 20/
OD_ADD: +1.25
OS_SPHERE: -3.00
OD_AXIS: 41
OD_CYLINDER: -0.25
OS_AXIS: 86
OD_AXIS: 93
OS_CYLINDER: -0.75
OS_AXIS: 98
OS_SPHERE: -2.75
OD_SPHERE: -4.50
OS_VPRISM_DIRECTION: PROGS
OD_CYLINDER: -0.25
OS_CYLINDER: -0.50
OS_ADD: +1.25

## 2018-06-08 ASSESSMENT — REFRACTION_AUTOREFRACTION
OD_SPHERE: -4.00
OS_SPHERE: -2.75
OS_CYLINDER: -0.50
OS_AXIS: 088

## 2018-06-08 ASSESSMENT — SPHEQUIV_DERIVED: OS_SPHEQUIV: -3

## 2018-06-08 ASSESSMENT — VISUAL ACUITY
OS_BCVA: 20/20-1
OD_BCVA: 20/20-2

## 2018-06-13 ENCOUNTER — DOCTOR'S OFFICE (OUTPATIENT)
Dept: URBAN - METROPOLITAN AREA CLINIC 136 | Facility: CLINIC | Age: 53
Setting detail: OPHTHALMOLOGY
End: 2018-06-13
Payer: COMMERCIAL

## 2018-06-13 DIAGNOSIS — H33.022: ICD-10-CM

## 2018-06-13 PROCEDURE — 99024 POSTOP FOLLOW-UP VISIT: CPT | Performed by: OPHTHALMOLOGY

## 2018-06-13 ASSESSMENT — REFRACTION_OUTSIDERX
OD_VA3: 20/
OS_CYLINDER: -0.50
OD_SPHERE: -2.75
OS_ADD: +2.50
OS_VA3: 20/
OD_ADD: +1.25
OD_CYLINDER: SPH
OD_SPHERE: -4.00
OD_VA3: 20/
OS_ADD: +1.25
OS_AXIS: 090
OD_VA2: 20/25
OS_AXIS: 090
OU_VA: 20/
OS_VA2: 20/20-2
OD_CYLINDER: SPH
OS_VA1: 20/20-2
OS_VA3: 20/
OD_VA2: 20/
OU_VA: 20/
OD_ADD: +2.50
OS_SPHERE: -1.50
OD_VA1: 20/
OS_VA1: 20/
OD_VA1: 20/25
OS_SPHERE: -2.75
OS_VA2: 20/
OS_CYLINDER: -0.50

## 2018-06-13 ASSESSMENT — REFRACTION_CURRENTRX
OS_SPHERE: -2.75
OS_OVR_VA: 20/
OD_ADD: +1.25
OD_SPHERE: -4.00
OS_CYLINDER: -0.50
OD_CYLINDER: -0.25
OD_SPHERE: -4.50
OS_VPRISM_DIRECTION: PROGS
OD_AXIS: 41
OS_SPHERE: -3.00
OD_CYLINDER: -0.25
OD_OVR_VA: 20/
OD_AXIS: 93
OD_OVR_VA: 20/
OS_ADD: +2.50
OS_AXIS: 98
OD_VPRISM_DIRECTION: PROGS
OS_OVR_VA: 20/
OS_AXIS: 86
OD_ADD: +2.50
OS_CYLINDER: -0.75
OS_OVR_VA: 20/
OD_OVR_VA: 20/
OD_VPRISM_DIRECTION: PROGS
OS_ADD: +1.25
OS_VPRISM_DIRECTION: PROGS

## 2018-06-13 ASSESSMENT — REFRACTION_AUTOREFRACTION
OS_CYLINDER: -0.50
OD_SPHERE: -4.00
OS_AXIS: 088
OS_SPHERE: -2.75

## 2018-06-13 ASSESSMENT — REFRACTION_MANIFEST
OS_VA1: 20/
OD_VA3: 20/
OD_VA2: 20/
OU_VA: 20/
OS_VA3: 20/
OS_VA2: 20/
OD_VA1: 20/

## 2018-06-13 ASSESSMENT — SPHEQUIV_DERIVED: OS_SPHEQUIV: -3

## 2018-06-13 ASSESSMENT — CONFRONTATIONAL VISUAL FIELD TEST (CVF)
OS_FINDINGS: FULL
OD_FINDINGS: FULL

## 2018-06-13 ASSESSMENT — DECREASING TEAR LAKE - SEVERITY SCORE
OD_DEC_TEARLAKE: 2+ 3+
OS_DEC_TEARLAKE: 2+ 3+

## 2018-06-13 ASSESSMENT — VISUAL ACUITY
OD_BCVA: 20/20-2
OS_BCVA: 20/20-

## 2018-08-15 ENCOUNTER — DOCTOR'S OFFICE (OUTPATIENT)
Dept: URBAN - METROPOLITAN AREA CLINIC 136 | Facility: CLINIC | Age: 53
Setting detail: OPHTHALMOLOGY
End: 2018-08-15
Payer: COMMERCIAL

## 2018-08-15 DIAGNOSIS — H33.022: ICD-10-CM

## 2018-08-15 PROCEDURE — 99024 POSTOP FOLLOW-UP VISIT: CPT | Performed by: OPHTHALMOLOGY

## 2018-08-15 ASSESSMENT — CONFRONTATIONAL VISUAL FIELD TEST (CVF)
OS_FINDINGS: FULL
OD_FINDINGS: FULL

## 2018-08-15 ASSESSMENT — DECREASING TEAR LAKE - SEVERITY SCORE
OD_DEC_TEARLAKE: 2+ 3+
OS_DEC_TEARLAKE: 2+ 3+

## 2018-08-16 ASSESSMENT — REFRACTION_CURRENTRX
OD_AXIS: 93
OS_SPHERE: -3.00
OD_OVR_VA: 20/
OS_OVR_VA: 20/
OD_OVR_VA: 20/
OD_VPRISM_DIRECTION: PROGS
OD_ADD: +2.50
OS_VPRISM_DIRECTION: PROGS
OD_AXIS: 41
OS_ADD: +1.25
OD_VPRISM_DIRECTION: PROGS
OD_CYLINDER: -0.25
OD_SPHERE: -4.00
OD_SPHERE: -4.50
OD_CYLINDER: -0.25
OS_VPRISM_DIRECTION: PROGS
OS_AXIS: 86
OS_ADD: +2.50
OS_CYLINDER: -0.75
OS_AXIS: 98
OS_OVR_VA: 20/
OD_OVR_VA: 20/
OD_ADD: +1.25
OS_OVR_VA: 20/
OS_CYLINDER: -0.50
OS_SPHERE: -2.75

## 2018-08-16 ASSESSMENT — REFRACTION_MANIFEST
OD_VA2: 20/
OU_VA: 20/
OS_VA1: 20/
OS_VA3: 20/
OS_VA2: 20/
OD_VA3: 20/
OD_VA1: 20/

## 2018-08-16 ASSESSMENT — REFRACTION_OUTSIDERX
OS_VA2: 20/20-2
OS_VA3: 20/
OD_VA1: 20/
OD_SPHERE: -2.75
OS_SPHERE: -1.50
OD_SPHERE: -4.00
OS_AXIS: 090
OD_VA2: 20/25
OS_VA2: 20/
OD_ADD: +2.50
OD_ADD: +1.25
OU_VA: 20/
OS_CYLINDER: -0.50
OS_VA3: 20/
OU_VA: 20/
OS_CYLINDER: -0.50
OS_AXIS: 090
OD_VA3: 20/
OS_ADD: +1.25
OS_ADD: +2.50
OD_CYLINDER: SPH
OS_VA1: 20/
OD_VA3: 20/
OD_VA1: 20/25
OD_VA2: 20/
OS_SPHERE: -2.75
OS_VA1: 20/20-2
OD_CYLINDER: SPH

## 2018-08-16 ASSESSMENT — VISUAL ACUITY
OS_BCVA: 20/20-
OD_BCVA: 20/20-

## 2018-08-16 ASSESSMENT — SPHEQUIV_DERIVED: OS_SPHEQUIV: -3

## 2018-08-16 ASSESSMENT — REFRACTION_AUTOREFRACTION
OS_AXIS: 088
OS_CYLINDER: -0.50
OD_SPHERE: -4.00
OS_SPHERE: -2.75

## 2018-11-14 ENCOUNTER — DOCTOR'S OFFICE (OUTPATIENT)
Dept: URBAN - METROPOLITAN AREA CLINIC 136 | Facility: CLINIC | Age: 53
Setting detail: OPHTHALMOLOGY
End: 2018-11-14
Payer: COMMERCIAL

## 2018-11-14 DIAGNOSIS — H33.021: ICD-10-CM

## 2018-11-14 DIAGNOSIS — H35.413: ICD-10-CM

## 2018-11-14 DIAGNOSIS — H43.813: ICD-10-CM

## 2018-11-14 DIAGNOSIS — H33.022: ICD-10-CM

## 2018-11-14 PROCEDURE — 92012 INTRM OPH EXAM EST PATIENT: CPT | Performed by: OPHTHALMOLOGY

## 2018-11-14 ASSESSMENT — REFRACTION_MANIFEST
OD_VA1: 20/
OD_SPHERE: -2.75
OD_VA2: 20/
OD_VA1: 20/25
OS_AXIS: 090
OS_AXIS: 090
OS_VA2: 20/20-2
OS_CYLINDER: -0.50
OD_VA3: 20/
OD_SPHERE: -4.00
OU_VA: 20/
OS_SPHERE: -2.75
OS_ADD: +2.50
OS_VA1: 20/20-2
OD_VA2: 20/25
OS_CYLINDER: -0.50
OU_VA: 20/
OD_ADD: +2.50
OS_VA3: 20/
OD_ADD: +1.25
OS_VA1: 20/
OS_ADD: +1.25
OS_SPHERE: -1.50
OD_CYLINDER: SPH
OS_VA2: 20/
OD_CYLINDER: SPH
OD_VA3: 20/
OS_VA3: 20/

## 2018-11-14 ASSESSMENT — REFRACTION_CURRENTRX
OS_SPHERE: -3.00
OS_VPRISM_DIRECTION: PROGS
OD_CYLINDER: -0.25
OD_VPRISM_DIRECTION: PROGS
OS_AXIS: 86
OS_OVR_VA: 20/
OD_AXIS: 93
OS_OVR_VA: 20/
OD_VPRISM_DIRECTION: PROGS
OD_ADD: +2.50
OS_CYLINDER: -0.75
OS_ADD: +2.50
OS_SPHERE: -2.75
OD_SPHERE: -4.50
OD_SPHERE: -4.00
OD_CYLINDER: -0.25
OD_ADD: +1.25
OD_OVR_VA: 20/
OS_VPRISM_DIRECTION: PROGS
OS_AXIS: 98
OS_ADD: +1.25
OD_OVR_VA: 20/
OD_OVR_VA: 20/
OS_OVR_VA: 20/
OS_CYLINDER: -0.50
OD_AXIS: 41

## 2018-11-14 ASSESSMENT — DECREASING TEAR LAKE - SEVERITY SCORE
OS_DEC_TEARLAKE: 2+ 3+
OD_DEC_TEARLAKE: 2+ 3+

## 2018-11-14 ASSESSMENT — REFRACTION_AUTOREFRACTION
OD_SPHERE: -4.00
OS_CYLINDER: -0.50
OS_SPHERE: -2.75
OS_AXIS: 088

## 2018-11-14 ASSESSMENT — SPHEQUIV_DERIVED
OS_SPHEQUIV: -1.75
OS_SPHEQUIV: -3
OS_SPHEQUIV: -3

## 2018-11-14 ASSESSMENT — VISUAL ACUITY
OD_BCVA: 20/30-2
OS_BCVA: 20/25-2

## 2018-11-21 ENCOUNTER — DOCTOR'S OFFICE (OUTPATIENT)
Dept: URBAN - NONMETROPOLITAN AREA CLINIC 1 | Facility: CLINIC | Age: 53
Setting detail: OPHTHALMOLOGY
End: 2018-11-21
Payer: COMMERCIAL

## 2018-11-21 DIAGNOSIS — H52.13: ICD-10-CM

## 2018-11-21 DIAGNOSIS — H52.4: ICD-10-CM

## 2018-11-21 PROCEDURE — 92015 DETERMINE REFRACTIVE STATE: CPT | Performed by: OPTOMETRIST

## 2018-11-21 ASSESSMENT — REFRACTION_MANIFEST
OD_SPHERE: -2.75
OS_VA3: 20/
OS_ADD: +1.25
OS_ADD: +2.50
OS_SPHERE: -1.50
OD_ADD: +2.50
OS_CYLINDER: -0.50
OD_CYLINDER: SPH
OU_VA: 20/
OD_SPHERE: -4.00
OS_VA2: 20/
OS_VA1: 20/
OD_VA3: 20/
OD_ADD: +1.25
OD_VA3: 20/
OD_VA2: 20/
OD_VA1: 20/25
OS_CYLINDER: -0.50
OD_VA1: 20/
OS_AXIS: 090
OS_VA1: 20/25-2
OS_AXIS: 090
OS_SPHERE: -2.75
OS_VA3: 20/
OD_VA2: 20/25
OD_CYLINDER: SPH
OU_VA: 20/
OS_VA2: 20/20-2

## 2018-11-21 ASSESSMENT — REFRACTION_CURRENTRX
OS_CYLINDER: -0.75
OS_AXIS: 107
OS_AXIS: 86
OS_OVR_VA: 20/
OS_VPRISM_DIRECTION: PROGS
OD_CYLINDER: -0.25
OS_OVR_VA: 20/
OD_ADD: +2.50
OS_VPRISM_DIRECTION: PROGS
OD_ADD: +2.50
OD_SPHERE: -4.00
OS_SPHERE: -2.50
OS_CYLINDER: -0.50
OS_SPHERE: -2.75
OD_OVR_VA: 20/
OD_VPRISM_DIRECTION: PROGS
OD_OVR_VA: 20/
OD_OVR_VA: 20/
OD_SPHERE: -4.00
OD_AXIS: 044
OS_ADD: +2.50
OD_AXIS: 93
OD_CYLINDER: -0.25
OS_ADD: +2.50
OS_OVR_VA: 20/
OD_VPRISM_DIRECTION: PROGS

## 2018-11-21 ASSESSMENT — REFRACTION_AUTOREFRACTION
OS_CYLINDER: -0.75
OD_CYLINDER: 0.00
OS_AXIS: 114
OS_SPHERE: -2.25
OD_SPHERE: -4.00

## 2018-11-21 ASSESSMENT — SPHEQUIV_DERIVED
OD_SPHEQUIV: -4
OS_SPHEQUIV: -3
OS_SPHEQUIV: -1.75
OS_SPHEQUIV: -2.625

## 2018-11-21 ASSESSMENT — VISUAL ACUITY
OS_BCVA: 20/25
OD_BCVA: 20/30-2

## 2019-02-27 ENCOUNTER — DOCTOR'S OFFICE (OUTPATIENT)
Dept: URBAN - METROPOLITAN AREA CLINIC 136 | Facility: CLINIC | Age: 54
Setting detail: OPHTHALMOLOGY
End: 2019-02-27
Payer: COMMERCIAL

## 2019-02-27 DIAGNOSIS — H33.023: ICD-10-CM

## 2019-02-27 DIAGNOSIS — H35.413: ICD-10-CM

## 2019-02-27 DIAGNOSIS — H33.021: ICD-10-CM

## 2019-02-27 DIAGNOSIS — H43.813: ICD-10-CM

## 2019-02-27 DIAGNOSIS — H53.19: ICD-10-CM

## 2019-02-27 PROCEDURE — 92014 COMPRE OPH EXAM EST PT 1/>: CPT | Performed by: OPHTHALMOLOGY

## 2019-02-27 PROCEDURE — 92134 CPTRZ OPH DX IMG PST SGM RTA: CPT | Performed by: OPHTHALMOLOGY

## 2019-02-27 ASSESSMENT — REFRACTION_AUTOREFRACTION
OD_CYLINDER: 0.00
OS_AXIS: 114
OS_CYLINDER: -0.75
OS_SPHERE: -2.25
OD_SPHERE: -4.00

## 2019-02-27 ASSESSMENT — REFRACTION_CURRENTRX
OD_AXIS: 044
OS_OVR_VA: 20/
OS_SPHERE: -2.50
OD_ADD: +2.50
OS_SPHERE: -2.75
OS_VPRISM_DIRECTION: PROGS
OS_ADD: +2.50
OD_AXIS: 93
OS_VPRISM_DIRECTION: PROGS
OD_OVR_VA: 20/
OS_OVR_VA: 20/
OD_SPHERE: -4.00
OD_OVR_VA: 20/
OD_SPHERE: -4.00
OS_CYLINDER: -0.75
OS_AXIS: 86
OD_ADD: +2.50
OD_OVR_VA: 20/
OD_CYLINDER: -0.25
OD_VPRISM_DIRECTION: PROGS
OD_CYLINDER: -0.25
OS_ADD: +2.50
OS_AXIS: 107
OS_CYLINDER: -0.50
OD_VPRISM_DIRECTION: PROGS
OS_OVR_VA: 20/

## 2019-02-27 ASSESSMENT — REFRACTION_MANIFEST
OS_SPHERE: -1.50
OS_AXIS: 090
OD_VA2: 20/25
OD_VA3: 20/
OS_VA3: 20/
OU_VA: 20/
OD_SPHERE: -4.00
OS_VA1: 20/25-2
OS_CYLINDER: -0.50
OD_VA3: 20/
OS_VA1: 20/
OS_VA3: 20/
OD_ADD: +2.50
OS_AXIS: 090
OS_VA2: 20/20-2
OS_ADD: +1.25
OD_VA1: 20/
OS_VA2: 20/
OS_CYLINDER: -0.50
OD_ADD: +1.25
OD_CYLINDER: SPH
OS_ADD: +2.50
OS_SPHERE: -2.75
OD_VA2: 20/
OD_VA1: 20/25
OD_CYLINDER: SPH
OD_SPHERE: -2.75
OU_VA: 20/

## 2019-02-27 ASSESSMENT — VISUAL ACUITY
OS_BCVA: 20/25+1
OD_BCVA: 20/20

## 2019-02-27 ASSESSMENT — SPHEQUIV_DERIVED
OD_SPHEQUIV: -4
OS_SPHEQUIV: -3
OS_SPHEQUIV: -2.625
OS_SPHEQUIV: -1.75

## 2019-02-27 ASSESSMENT — CONFRONTATIONAL VISUAL FIELD TEST (CVF)
OD_FINDINGS: FULL
OS_FINDINGS: FULL

## 2019-02-27 ASSESSMENT — DECREASING TEAR LAKE - SEVERITY SCORE
OD_DEC_TEARLAKE: 2+ 3+
OS_DEC_TEARLAKE: 2+ 3+

## 2019-06-26 ENCOUNTER — DOCTOR'S OFFICE (OUTPATIENT)
Dept: URBAN - METROPOLITAN AREA CLINIC 136 | Facility: CLINIC | Age: 54
Setting detail: OPHTHALMOLOGY
End: 2019-06-26
Payer: COMMERCIAL

## 2019-06-26 DIAGNOSIS — H43.813: ICD-10-CM

## 2019-06-26 DIAGNOSIS — H35.413: ICD-10-CM

## 2019-06-26 PROCEDURE — 92014 COMPRE OPH EXAM EST PT 1/>: CPT | Performed by: OPHTHALMOLOGY

## 2019-06-26 ASSESSMENT — REFRACTION_CURRENTRX
OS_ADD: +2.50
OS_VPRISM_DIRECTION: PROGS
OS_OVR_VA: 20/
OD_AXIS: 93
OS_SPHERE: -2.75
OD_ADD: +2.50
OS_OVR_VA: 20/
OS_OVR_VA: 20/
OD_CYLINDER: -0.25
OS_CYLINDER: -0.50
OD_OVR_VA: 20/
OD_VPRISM_DIRECTION: PROGS
OS_ADD: +2.50
OD_SPHERE: -4.00
OS_AXIS: 86
OS_AXIS: 107
OD_CYLINDER: -0.25
OS_VPRISM_DIRECTION: PROGS
OS_SPHERE: -2.50
OD_OVR_VA: 20/
OD_VPRISM_DIRECTION: PROGS
OD_AXIS: 044
OD_ADD: +2.50
OD_SPHERE: -4.00
OD_OVR_VA: 20/
OS_CYLINDER: -0.75

## 2019-06-26 ASSESSMENT — REFRACTION_MANIFEST
OS_SPHERE: -1.50
OD_VA2: 20/25
OD_CYLINDER: SPH
OS_AXIS: 090
OD_VA3: 20/
OU_VA: 20/
OS_VA3: 20/
OD_VA3: 20/
OS_VA1: 20/25-2
OD_ADD: +2.50
OD_ADD: +1.25
OD_SPHERE: -4.00
OS_VA1: 20/
OU_VA: 20/
OS_CYLINDER: -0.50
OS_ADD: +2.50
OD_VA2: 20/
OS_CYLINDER: -0.50
OD_VA1: 20/
OD_VA1: 20/25
OD_SPHERE: -2.75
OD_CYLINDER: SPH
OS_VA2: 20/20-2
OS_AXIS: 090
OS_VA3: 20/
OS_SPHERE: -2.75
OS_VA2: 20/
OS_ADD: +1.25

## 2019-06-26 ASSESSMENT — SPHEQUIV_DERIVED
OD_SPHEQUIV: -4
OS_SPHEQUIV: -2.625
OS_SPHEQUIV: -1.75
OS_SPHEQUIV: -3

## 2019-06-26 ASSESSMENT — REFRACTION_AUTOREFRACTION
OS_SPHERE: -2.25
OD_CYLINDER: 0.00
OD_SPHERE: -4.00
OS_CYLINDER: -0.75
OS_AXIS: 114

## 2019-06-26 ASSESSMENT — VISUAL ACUITY
OS_BCVA: 20/20
OD_BCVA: 20/20

## 2019-06-26 ASSESSMENT — CONFRONTATIONAL VISUAL FIELD TEST (CVF)
OD_FINDINGS: FULL
OS_FINDINGS: FULL

## 2019-12-11 ENCOUNTER — DOCTOR'S OFFICE (OUTPATIENT)
Dept: URBAN - METROPOLITAN AREA CLINIC 136 | Facility: CLINIC | Age: 54
Setting detail: OPHTHALMOLOGY
End: 2019-12-11
Payer: COMMERCIAL

## 2019-12-11 DIAGNOSIS — H35.412: ICD-10-CM

## 2019-12-11 DIAGNOSIS — H53.19: ICD-10-CM

## 2019-12-11 DIAGNOSIS — H35.373: ICD-10-CM

## 2019-12-11 DIAGNOSIS — H43.813: ICD-10-CM

## 2019-12-11 DIAGNOSIS — H33.321: ICD-10-CM

## 2019-12-11 DIAGNOSIS — H35.413: ICD-10-CM

## 2019-12-11 DIAGNOSIS — H35.411: ICD-10-CM

## 2019-12-11 PROCEDURE — 92014 COMPRE OPH EXAM EST PT 1/>: CPT | Performed by: OPHTHALMOLOGY

## 2019-12-11 PROCEDURE — 92134 CPTRZ OPH DX IMG PST SGM RTA: CPT | Performed by: OPHTHALMOLOGY

## 2019-12-11 PROCEDURE — 92226 OPHTHALMOSCOPY EXT SUBSEQUENT: CPT | Performed by: OPHTHALMOLOGY

## 2019-12-11 PROCEDURE — 67145 PROPH RTA DTCHMNT PC: CPT | Performed by: OPHTHALMOLOGY

## 2019-12-11 ASSESSMENT — REFRACTION_MANIFEST
OS_CYLINDER: -0.50
OS_SPHERE: -2.75
OD_VA2: 20/25
OS_CYLINDER: -0.50
OS_ADD: +2.50
OS_VA3: 20/
OD_CYLINDER: SPH
OS_AXIS: 090
OS_VA2: 20/20-2
OS_SPHERE: -1.50
OD_ADD: +2.50
OD_ADD: +1.25
OU_VA: 20/
OS_VA3: 20/
OU_VA: 20/
OD_SPHERE: -2.75
OS_VA1: 20/
OS_VA1: 20/25-2
OD_CYLINDER: SPH
OD_SPHERE: -4.00
OS_ADD: +1.25
OS_AXIS: 090
OD_VA1: 20/25
OD_VA3: 20/
OD_VA2: 20/
OD_VA1: 20/
OS_VA2: 20/
OD_VA3: 20/

## 2019-12-11 ASSESSMENT — SPHEQUIV_DERIVED
OS_SPHEQUIV: -3
OD_SPHEQUIV: -4
OS_SPHEQUIV: -2.625
OS_SPHEQUIV: -1.75

## 2019-12-11 ASSESSMENT — REFRACTION_CURRENTRX
OD_ADD: +2.50
OS_ADD: +2.50
OS_OVR_VA: 20/
OD_OVR_VA: 20/
OD_AXIS: 93
OD_OVR_VA: 20/
OD_VPRISM_DIRECTION: PROGS
OD_VPRISM_DIRECTION: PROGS
OD_OVR_VA: 20/
OS_SPHERE: -2.75
OS_CYLINDER: -0.50
OS_OVR_VA: 20/
OD_SPHERE: -4.00
OD_ADD: +2.50
OD_AXIS: 044
OS_VPRISM_DIRECTION: PROGS
OS_ADD: +2.50
OS_AXIS: 86
OS_OVR_VA: 20/
OD_CYLINDER: -0.25
OS_AXIS: 107
OS_SPHERE: -2.50
OD_SPHERE: -4.00
OD_CYLINDER: -0.25
OS_VPRISM_DIRECTION: PROGS
OS_CYLINDER: -0.75

## 2019-12-11 ASSESSMENT — REFRACTION_AUTOREFRACTION
OS_CYLINDER: -0.75
OS_AXIS: 114
OD_SPHERE: -4.00
OS_SPHERE: -2.25
OD_CYLINDER: 0.00

## 2019-12-11 ASSESSMENT — CONFRONTATIONAL VISUAL FIELD TEST (CVF)
OD_FINDINGS: FULL
OS_FINDINGS: FULL

## 2019-12-11 ASSESSMENT — VISUAL ACUITY
OS_BCVA: 20/20
OD_BCVA: 20/20-2

## 2020-01-15 ENCOUNTER — DOCTOR'S OFFICE (OUTPATIENT)
Dept: URBAN - METROPOLITAN AREA CLINIC 136 | Facility: CLINIC | Age: 55
Setting detail: OPHTHALMOLOGY
End: 2020-01-15
Payer: COMMERCIAL

## 2020-01-15 VITALS — HEIGHT: 55 IN

## 2020-01-15 DIAGNOSIS — H43.813: ICD-10-CM

## 2020-01-15 DIAGNOSIS — H33.022: ICD-10-CM

## 2020-01-15 DIAGNOSIS — H33.023: ICD-10-CM

## 2020-01-15 DIAGNOSIS — H35.412: ICD-10-CM

## 2020-01-15 DIAGNOSIS — H35.373: ICD-10-CM

## 2020-01-15 DIAGNOSIS — H35.413: ICD-10-CM

## 2020-01-15 DIAGNOSIS — H33.321: ICD-10-CM

## 2020-01-15 DIAGNOSIS — H35.411: ICD-10-CM

## 2020-01-15 PROCEDURE — 92134 CPTRZ OPH DX IMG PST SGM RTA: CPT | Performed by: OPHTHALMOLOGY

## 2020-01-15 PROCEDURE — 99024 POSTOP FOLLOW-UP VISIT: CPT | Performed by: OPHTHALMOLOGY

## 2020-01-15 PROCEDURE — 92201 OPSCPY EXTND RTA DRAW UNI/BI: CPT | Performed by: OPHTHALMOLOGY

## 2020-01-15 ASSESSMENT — REFRACTION_MANIFEST
OS_AXIS: 090
OS_AXIS: 090
OD_ADD: +1.25
OS_VA3: 20/
OS_VA3: 20/
OS_VA2: 20/25
OD_SPHERE: -4.00
OS_VA1: 20/25-2
OD_CYLINDER: SPH
OS_VA1: 20/
OS_ADD: +1.25
OD_VA2: 20/25
OD_VA3: 20/
OS_CYLINDER: -0.50
OD_VA1: 20/25
OD_VA1: 20/
OD_ADD: +2.50
OD_SPHERE: -2.75
OS_SPHERE: -2.75
OS_SPHERE: -1.50
OS_CYLINDER: -0.50
OD_VA3: 20/
OU_VA: 20/
OS_VA2: 20/
OD_CYLINDER: SPH
OU_VA: 20/
OS_ADD: +2.50
OD_VA2: 20/

## 2020-01-15 ASSESSMENT — REFRACTION_CURRENTRX
OS_OVR_VA: 20/
OD_ADD: +2.50
OD_VPRISM_DIRECTION: PROGS
OS_ADD: +2.50
OD_ADD: +2.50
OD_AXIS: 044
OS_SPHERE: -2.75
OD_CYLINDER: -0.25
OS_AXIS: 86
OD_OVR_VA: 20/
OS_CYLINDER: -0.75
OD_OVR_VA: 20/
OD_AXIS: 93
OD_CYLINDER: -0.25
OS_VPRISM_DIRECTION: PROGS
OS_AXIS: 107
OD_SPHERE: -4.00
OS_CYLINDER: -0.50
OS_OVR_VA: 20/
OD_SPHERE: -4.00
OS_ADD: +2.50
OS_VPRISM_DIRECTION: PROGS
OD_VPRISM_DIRECTION: PROGS
OS_SPHERE: -2.50

## 2020-01-15 ASSESSMENT — REFRACTION_AUTOREFRACTION
OS_AXIS: 066
OD_SPHERE: -4.00
OS_CYLINDER: -0.50
OS_SPHERE: -2.25
OD_CYLINDER: 0.00

## 2020-01-15 ASSESSMENT — VISUAL ACUITY
OD_BCVA: 20/20-2
OS_BCVA: 20/20-2

## 2020-01-15 ASSESSMENT — CONFRONTATIONAL VISUAL FIELD TEST (CVF)
OD_FINDINGS: FULL
OS_FINDINGS: FULL

## 2020-01-15 ASSESSMENT — SPHEQUIV_DERIVED
OS_SPHEQUIV: -3
OS_SPHEQUIV: -1.75
OD_SPHEQUIV: -4
OS_SPHEQUIV: -2.5

## 2020-07-08 ENCOUNTER — DOCTOR'S OFFICE (OUTPATIENT)
Dept: URBAN - METROPOLITAN AREA CLINIC 136 | Facility: CLINIC | Age: 55
Setting detail: OPHTHALMOLOGY
End: 2020-07-08
Payer: COMMERCIAL

## 2020-07-08 DIAGNOSIS — H43.813: ICD-10-CM

## 2020-07-08 DIAGNOSIS — H35.373: ICD-10-CM

## 2020-07-08 DIAGNOSIS — H33.023: ICD-10-CM

## 2020-07-08 DIAGNOSIS — H33.321: ICD-10-CM

## 2020-07-08 DIAGNOSIS — H35.413: ICD-10-CM

## 2020-07-08 PROBLEM — H35.411 LATTICE DEGENERATION; RIGHT EYE, LEFT EYE, BOTH EYES: Status: ACTIVE | Noted: 2017-10-02

## 2020-07-08 PROBLEM — H25.043 CATARACT, POSTERIOR SUBCAPSULAR ; BOTH EYES: Status: ACTIVE | Noted: 2018-04-11

## 2020-07-08 PROBLEM — H53.19: Status: ACTIVE | Noted: 2017-02-01

## 2020-07-08 PROBLEM — H35.412 LATTICE DEGENERATION; RIGHT EYE, LEFT EYE, BOTH EYES: Status: ACTIVE | Noted: 2017-10-02

## 2020-07-08 PROBLEM — H04.123 DRY EYE; BOTH EYES: Status: ACTIVE | Noted: 2018-02-05

## 2020-07-08 PROBLEM — H25.13 CATARACT NUCLEAR SCLEROSIS ; BOTH EYES: Status: ACTIVE | Noted: 2017-02-01

## 2020-07-08 PROCEDURE — 92014 COMPRE OPH EXAM EST PT 1/>: CPT | Performed by: OPHTHALMOLOGY

## 2020-07-08 PROCEDURE — 92134 CPTRZ OPH DX IMG PST SGM RTA: CPT | Performed by: OPHTHALMOLOGY

## 2020-07-08 ASSESSMENT — REFRACTION_CURRENTRX
OS_CYLINDER: -0.75
OD_CYLINDER: -0.25
OS_SPHERE: -2.75
OD_SPHERE: -4.00
OD_OVR_VA: 20/
OD_SPHERE: -4.00
OD_CYLINDER: -0.25
OS_AXIS: 86
OS_VPRISM_DIRECTION: PROGS
OS_AXIS: 107
OS_ADD: +2.50
OS_CYLINDER: -0.50
OS_ADD: +2.50
OS_SPHERE: -2.50
OS_OVR_VA: 20/
OD_AXIS: 044
OD_AXIS: 93
OD_ADD: +2.50
OS_VPRISM_DIRECTION: PROGS
OD_VPRISM_DIRECTION: PROGS
OD_ADD: +2.50
OD_VPRISM_DIRECTION: PROGS
OS_OVR_VA: 20/
OD_OVR_VA: 20/

## 2020-07-08 ASSESSMENT — SPHEQUIV_DERIVED
OS_SPHEQUIV: -1.75
OS_SPHEQUIV: -3
OS_SPHEQUIV: -2.5
OD_SPHEQUIV: -4

## 2020-07-08 ASSESSMENT — REFRACTION_MANIFEST
OD_VA1: 20/25
OD_ADD: +1.25
OS_ADD: +1.25
OS_CYLINDER: -0.50
OD_ADD: +2.50
OS_ADD: +2.50
OS_SPHERE: -2.75
OD_SPHERE: -4.00
OS_AXIS: 090
OS_VA1: 20/25-2
OD_CYLINDER: SPH
OD_VA2: 20/25
OD_CYLINDER: SPH
OD_SPHERE: -2.75
OS_SPHERE: -1.50
OS_AXIS: 090
OS_VA2: 20/25
OS_CYLINDER: -0.50

## 2020-07-08 ASSESSMENT — CONFRONTATIONAL VISUAL FIELD TEST (CVF)
OS_FINDINGS: FULL
OD_FINDINGS: FULL

## 2020-07-08 ASSESSMENT — VISUAL ACUITY
OS_BCVA: 20/20
OD_BCVA: 20/25+2

## 2020-07-08 ASSESSMENT — REFRACTION_AUTOREFRACTION
OS_SPHERE: -2.25
OS_CYLINDER: -0.50
OD_CYLINDER: 0.00
OS_AXIS: 066
OD_SPHERE: -4.00

## 2020-07-09 PROBLEM — G43.809 OCULAR MIGRAINE W/OUT INTRACTABLE: Status: ACTIVE | Noted: 2020-07-08

## 2020-07-09 PROBLEM — H33.022: Status: ACTIVE | Noted: 2020-07-08

## 2021-12-16 ENCOUNTER — APPOINTMENT (RX ONLY)
Dept: URBAN - NONMETROPOLITAN AREA CLINIC 13 | Facility: CLINIC | Age: 56
Setting detail: DERMATOLOGY
End: 2021-12-16

## 2021-12-16 DIAGNOSIS — Z41.9 ENCOUNTER FOR PROCEDURE FOR PURPOSES OTHER THAN REMEDYING HEALTH STATE, UNSPECIFIED: ICD-10-CM

## 2021-12-16 PROCEDURE — ? BOTOX

## 2022-04-07 ENCOUNTER — APPOINTMENT (RX ONLY)
Dept: URBAN - NONMETROPOLITAN AREA CLINIC 13 | Facility: CLINIC | Age: 57
Setting detail: DERMATOLOGY
End: 2022-04-07

## 2022-04-07 DIAGNOSIS — Z41.9 ENCOUNTER FOR PROCEDURE FOR PURPOSES OTHER THAN REMEDYING HEALTH STATE, UNSPECIFIED: ICD-10-CM

## 2022-04-07 PROCEDURE — ? BOTOX

## 2022-04-07 PROCEDURE — ? ADDITIONAL NOTES

## 2022-04-07 NOTE — PROCEDURE: ADDITIONAL NOTES
Detail Level: Simple
Render Risk Assessment In Note?: no
Additional Notes: wanted to stick to $300 exactly, 2u ps given for forehead.

## 2022-04-07 NOTE — PROCEDURE: BOTOX
Show Forehead Units: Yes
Lateral Platysmal Bands Units: 0
Show Lcl Units: No
Lot #: [id number]
Additional Area 4 Location: under lower lash line
Price (Use Numbers Only, No Special Characters Or $): 300
Glabellar Complex Units: 25
Additional Area 1 Location: brow
Detail Level: Zone
Additional Area 6 Location: chin
Consent: Written consent obtained. Risks include but not limited to lid/brow ptosis, bruising, swelling, diplopia, temporary effect, incomplete chemical denervation.
Dilution (U/0.1 Cc): 1
Additional Area 3 Location: [facility] [place]
Expiration Date (Month Year): 5/24
Additional Area 5 Location: forehead R side peak
Additional Area 2 Location: upper lip
Post-Care Instructions: Patient instructed to not lie down for 4 hours and limit physical activity for 24 hours. Patient instructed not to travel by airplane for 48 hours.
Forehead Units: 2

## 2022-08-16 ENCOUNTER — APPOINTMENT (RX ONLY)
Dept: URBAN - METROPOLITAN AREA CLINIC 1 | Facility: CLINIC | Age: 57
Setting detail: DERMATOLOGY
End: 2022-08-16

## 2022-08-16 DIAGNOSIS — Z41.9 ENCOUNTER FOR PROCEDURE FOR PURPOSES OTHER THAN REMEDYING HEALTH STATE, UNSPECIFIED: ICD-10-CM

## 2022-08-16 PROCEDURE — ? BOTOX

## 2022-08-16 ASSESSMENT — LOCATION DETAILED DESCRIPTION DERM
LOCATION DETAILED: RIGHT INFERIOR FOREHEAD
LOCATION DETAILED: SUPERIOR MID FOREHEAD
LOCATION DETAILED: RIGHT LATERAL FOREHEAD
LOCATION DETAILED: LEFT FOREHEAD
LOCATION DETAILED: RIGHT INFERIOR MEDIAL FOREHEAD
LOCATION DETAILED: RIGHT SUPERIOR FOREHEAD
LOCATION DETAILED: LEFT INFERIOR FOREHEAD
LOCATION DETAILED: GLABELLA
LOCATION DETAILED: LEFT INFERIOR MEDIAL FOREHEAD
LOCATION DETAILED: LEFT SUPERIOR FOREHEAD

## 2022-08-16 ASSESSMENT — LOCATION ZONE DERM: LOCATION ZONE: FACE

## 2022-08-16 ASSESSMENT — LOCATION SIMPLE DESCRIPTION DERM
LOCATION SIMPLE: RIGHT FOREHEAD
LOCATION SIMPLE: SUPERIOR FOREHEAD
LOCATION SIMPLE: GLABELLA
LOCATION SIMPLE: LEFT FOREHEAD

## 2022-08-16 NOTE — PROCEDURE: BOTOX
Additional Area 1 Location: Lateral brows
R Brow Units: 0
Show Additional Area 2: Yes
Glabellar Complex Units: 20
Lot #: [id number]
Additional Area 4 Location: Brow lift
Detail Level: Detailed
Show Mentalis Units: No
Price (Use Numbers Only, No Special Characters Or $): 615
Dilution (U/0.1 Cc): 4
Consent obtained through EMA charting. Risks include but not limited to lid/brow ptosis, bruising, swelling, diplopia, temporary effect, incomplete chemical denervation.\\n\\nPhotos taken prior to injections.\\n
Additional Area 2 Location: Upper Jaqueline-Oral Lines
Additional Area 5 Location: Infraorbital eyelids
Comments: Patient comes in for botox to the glabella and forehead. She reports she had 2 units last visit to her forehead and wishes to increase dosage to what she had 2 visits, patient informed visit before was 5 units. \\n\\n Patient agrees with plan and verbalizes understanding.\\n\\n Advised her to come back for a 2 week follow up for potential dose adjustment.\\n\\n
Additional Area 6 Location: Lateral brow lift
Additional Area 3 Location: [place]
Post-Care Instructions: Instructed to not lie down for 4 hours, do not massage areas injected, and limit physical activity for 24 hours.
Forehead Units: 5
Expiration Date (Month Year): 07/2024

## 2022-12-15 ENCOUNTER — APPOINTMENT (RX ONLY)
Dept: URBAN - NONMETROPOLITAN AREA CLINIC 13 | Facility: CLINIC | Age: 57
Setting detail: DERMATOLOGY
End: 2022-12-15

## 2022-12-15 DIAGNOSIS — Z41.9 ENCOUNTER FOR PROCEDURE FOR PURPOSES OTHER THAN REMEDYING HEALTH STATE, UNSPECIFIED: ICD-10-CM

## 2022-12-15 PROCEDURE — ? BOTOX

## 2022-12-15 PROCEDURE — ? COSMETIC CONSULTATION: BOTOX

## 2022-12-15 ASSESSMENT — LOCATION ZONE DERM: LOCATION ZONE: FACE

## 2022-12-15 ASSESSMENT — LOCATION SIMPLE DESCRIPTION DERM
LOCATION SIMPLE: RIGHT FOREHEAD
LOCATION SIMPLE: LEFT FOREHEAD
LOCATION SIMPLE: GLABELLA
LOCATION SIMPLE: SUPERIOR FOREHEAD

## 2022-12-15 ASSESSMENT — LOCATION DETAILED DESCRIPTION DERM
LOCATION DETAILED: SUPERIOR MID FOREHEAD
LOCATION DETAILED: RIGHT FOREHEAD
LOCATION DETAILED: RIGHT INFERIOR MEDIAL FOREHEAD
LOCATION DETAILED: LEFT SUPERIOR FOREHEAD
LOCATION DETAILED: GLABELLA
LOCATION DETAILED: RIGHT INFERIOR FOREHEAD
LOCATION DETAILED: RIGHT SUPERIOR FOREHEAD
LOCATION DETAILED: LEFT INFERIOR MEDIAL FOREHEAD
LOCATION DETAILED: LEFT INFERIOR FOREHEAD

## 2022-12-15 NOTE — PROCEDURE: BOTOX
Show Anterior Platysmal Band Units: Yes
Additional Area 6 Units: 0
Expiration Date (Month Year): 11/2024
Show Lcl Units: No
Price (Use Numbers Only, No Special Characters Or $): 300.00
Glabellar Complex Units: 20
Forehead Units: 5
Additional Area 4 Location: Jaqueline-oral lip lines
Lot #: [id number]
Additional Area 1 Location: Lateral brow lift
Dilution (U/0.1 Cc): 4
Detail Level: Detailed
Consent obtained through EMA charting. Risks include but not limited to lid/brow ptosis, bruising, swelling, diplopia, temporary effect, incomplete chemical denervation.\\n\\n\\nPhotos taken prior to injection.\\n
Additional Area 2 Location: Upper Jaqueline-Oral Lines
Additional Area 5 Location: Infraorbital eyelids
Comments: Patient encouraged to come back in 2 weeks for touch up if needed.\\n\\n Patient agrees with plan and verbalizes understanding.\\n\\n\\n
Post-Care Instructions: Instructed to not lie down for 4 hours, do not massage areas injected, avoid headbands/hats if forehead was treated, and limit physical activity and extreme heat for 24 hours.
Additional Area 3 Location: Brow Lift
Additional Area 6 Location: Chin

## 2023-01-31 ENCOUNTER — APPOINTMENT (RX ONLY)
Dept: URBAN - NONMETROPOLITAN AREA CLINIC 13 | Facility: CLINIC | Age: 58
Setting detail: DERMATOLOGY
End: 2023-01-31

## 2023-01-31 DIAGNOSIS — L81.4 OTHER MELANIN HYPERPIGMENTATION: ICD-10-CM

## 2023-01-31 DIAGNOSIS — D22 MELANOCYTIC NEVI: ICD-10-CM

## 2023-01-31 DIAGNOSIS — L57.8 OTHER SKIN CHANGES DUE TO CHRONIC EXPOSURE TO NONIONIZING RADIATION: ICD-10-CM

## 2023-01-31 DIAGNOSIS — L82.1 OTHER SEBORRHEIC KERATOSIS: ICD-10-CM

## 2023-01-31 PROBLEM — D22.5 MELANOCYTIC NEVI OF TRUNK: Status: ACTIVE | Noted: 2023-01-31

## 2023-01-31 PROCEDURE — ? COUNSELING

## 2023-01-31 PROCEDURE — ? OBSERVATION AND MEASURE

## 2023-01-31 PROCEDURE — 99203 OFFICE O/P NEW LOW 30 MIN: CPT

## 2023-01-31 PROCEDURE — ? PHOTO-DOCUMENTATION

## 2023-01-31 ASSESSMENT — LOCATION SIMPLE DESCRIPTION DERM
LOCATION SIMPLE: LEFT UPPER BACK
LOCATION SIMPLE: LEFT CHEEK
LOCATION SIMPLE: RIGHT UPPER BACK
LOCATION SIMPLE: RIGHT FOREARM
LOCATION SIMPLE: RIGHT UPPER BACK
LOCATION SIMPLE: LEFT UPPER BACK
LOCATION SIMPLE: RIGHT CHEEK
LOCATION SIMPLE: LEFT CHEEK
LOCATION SIMPLE: RIGHT CHEEK

## 2023-01-31 ASSESSMENT — LOCATION ZONE DERM
LOCATION ZONE: FACE
LOCATION ZONE: TRUNK
LOCATION ZONE: TRUNK
LOCATION ZONE: ARM
LOCATION ZONE: FACE

## 2023-01-31 ASSESSMENT — LOCATION DETAILED DESCRIPTION DERM
LOCATION DETAILED: RIGHT SUPERIOR UPPER BACK
LOCATION DETAILED: RIGHT CENTRAL MALAR CHEEK
LOCATION DETAILED: LEFT LATERAL MALAR CHEEK
LOCATION DETAILED: RIGHT DISTAL RADIAL DORSAL FOREARM
LOCATION DETAILED: RIGHT CENTRAL MALAR CHEEK
LOCATION DETAILED: LEFT LATERAL MALAR CHEEK
LOCATION DETAILED: LEFT SUPERIOR MEDIAL UPPER BACK
LOCATION DETAILED: LEFT SUPERIOR MEDIAL UPPER BACK
LOCATION DETAILED: RIGHT SUPERIOR UPPER BACK

## 2023-01-31 NOTE — PROCEDURE: PHOTO-DOCUMENTATION
Detail Level: Zone
Photo Preface (Leave Blank If You Do Not Want): * Photographs were obtained today

## 2023-03-08 ENCOUNTER — APPOINTMENT (RX ONLY)
Dept: URBAN - NONMETROPOLITAN AREA CLINIC 4 | Facility: CLINIC | Age: 58
Setting detail: DERMATOLOGY
End: 2023-03-08

## 2023-03-08 DIAGNOSIS — D18.0 HEMANGIOMA: ICD-10-CM

## 2023-03-08 DIAGNOSIS — L82.0 INFLAMED SEBORRHEIC KERATOSIS: ICD-10-CM

## 2023-03-08 DIAGNOSIS — D22 MELANOCYTIC NEVI: ICD-10-CM

## 2023-03-08 DIAGNOSIS — Z71.89 OTHER SPECIFIED COUNSELING: ICD-10-CM

## 2023-03-08 DIAGNOSIS — Z85.820 PERSONAL HISTORY OF MALIGNANT MELANOMA OF SKIN: ICD-10-CM

## 2023-03-08 DIAGNOSIS — L81.4 OTHER MELANIN HYPERPIGMENTATION: ICD-10-CM

## 2023-03-08 DIAGNOSIS — L82.1 OTHER SEBORRHEIC KERATOSIS: ICD-10-CM

## 2023-03-08 PROBLEM — D22.39 MELANOCYTIC NEVI OF OTHER PARTS OF FACE: Status: ACTIVE | Noted: 2023-03-08

## 2023-03-08 PROBLEM — D18.01 HEMANGIOMA OF SKIN AND SUBCUTANEOUS TISSUE: Status: ACTIVE | Noted: 2023-03-08

## 2023-03-08 PROBLEM — D22.5 MELANOCYTIC NEVI OF TRUNK: Status: ACTIVE | Noted: 2023-03-08

## 2023-03-08 PROCEDURE — ? FULL BODY SKIN EXAM

## 2023-03-08 PROCEDURE — ? COUNSELING

## 2023-03-08 PROCEDURE — 99203 OFFICE O/P NEW LOW 30 MIN: CPT

## 2023-03-08 PROCEDURE — ? DEFER

## 2023-03-08 PROCEDURE — ? SUNSCREEN RECOMMENDATIONS

## 2023-03-08 ASSESSMENT — LOCATION DETAILED DESCRIPTION DERM
LOCATION DETAILED: LEFT INFERIOR LATERAL NECK
LOCATION DETAILED: RIGHT ANTERIOR PROXIMAL UPPER ARM
LOCATION DETAILED: LEFT SUPERIOR MEDIAL UPPER BACK
LOCATION DETAILED: INFERIOR MID FOREHEAD
LOCATION DETAILED: RIGHT MEDIAL SUPERIOR CHEST
LOCATION DETAILED: RIGHT SUPERIOR UPPER BACK
LOCATION DETAILED: RIGHT POSTERIOR SHOULDER
LOCATION DETAILED: LEFT SUPERIOR LATERAL NECK
LOCATION DETAILED: SUPERIOR THORACIC SPINE
LOCATION DETAILED: RIGHT MID-UPPER BACK
LOCATION DETAILED: EPIGASTRIC SKIN
LOCATION DETAILED: UPPER STERNUM

## 2023-03-08 ASSESSMENT — LOCATION SIMPLE DESCRIPTION DERM
LOCATION SIMPLE: RIGHT UPPER ARM
LOCATION SIMPLE: UPPER BACK
LOCATION SIMPLE: CHEST
LOCATION SIMPLE: NECK
LOCATION SIMPLE: INFERIOR FOREHEAD
LOCATION SIMPLE: LEFT UPPER BACK
LOCATION SIMPLE: RIGHT UPPER BACK
LOCATION SIMPLE: RIGHT SHOULDER
LOCATION SIMPLE: LEFT ANTERIOR NECK
LOCATION SIMPLE: ABDOMEN

## 2023-03-08 ASSESSMENT — LOCATION ZONE DERM
LOCATION ZONE: ARM
LOCATION ZONE: FACE
LOCATION ZONE: TRUNK
LOCATION ZONE: NECK

## 2023-03-08 ASSESSMENT — PAIN INTENSITY VAS: HOW INTENSE IS YOUR PAIN 0 BEING NO PAIN, 10 BEING THE MOST SEVERE PAIN POSSIBLE?: NO PAIN

## 2023-03-08 NOTE — HPI: EVALUATION OF SKIN LESION(S)
What Type Of Note Output Would You Prefer (Optional)?: Standard Output
Hpi Title: Evaluation of Skin Lesions
How Severe Are Your Spot(S)?: mild
Have Your Spot(S) Been Treated In The Past?: has not been treated
Location: Left side of the neck
Year Removed: Aug 2021

## 2023-04-11 ENCOUNTER — APPOINTMENT (RX ONLY)
Dept: URBAN - METROPOLITAN AREA CLINIC 1 | Facility: CLINIC | Age: 58
Setting detail: DERMATOLOGY
End: 2023-04-11

## 2023-04-11 DIAGNOSIS — Z41.9 ENCOUNTER FOR PROCEDURE FOR PURPOSES OTHER THAN REMEDYING HEALTH STATE, UNSPECIFIED: ICD-10-CM

## 2023-04-11 PROCEDURE — ? BOTOX

## 2023-04-11 ASSESSMENT — LOCATION DETAILED DESCRIPTION DERM
LOCATION DETAILED: LEFT SUPERIOR MEDIAL FOREHEAD
LOCATION DETAILED: RIGHT CENTRAL FRONTAL SCALP
LOCATION DETAILED: LEFT INFERIOR MEDIAL FOREHEAD
LOCATION DETAILED: RIGHT SUPERIOR LATERAL FOREHEAD
LOCATION DETAILED: LEFT CENTRAL FRONTAL SCALP
LOCATION DETAILED: GLABELLA
LOCATION DETAILED: RIGHT INFERIOR FOREHEAD
LOCATION DETAILED: RIGHT INFERIOR MEDIAL FOREHEAD
LOCATION DETAILED: LEFT LATERAL FOREHEAD

## 2023-04-11 ASSESSMENT — LOCATION ZONE DERM
LOCATION ZONE: FACE
LOCATION ZONE: SCALP

## 2023-04-11 ASSESSMENT — LOCATION SIMPLE DESCRIPTION DERM
LOCATION SIMPLE: LEFT SCALP
LOCATION SIMPLE: RIGHT SCALP
LOCATION SIMPLE: GLABELLA
LOCATION SIMPLE: RIGHT FOREHEAD
LOCATION SIMPLE: LEFT FOREHEAD

## 2023-04-11 NOTE — PROCEDURE: BOTOX
Periorbital Skin Units: 0
Expiration Date (Month Year): 04/2025
Show Glabellar Units: Yes
Additional Area 4 Location: Jaqueline-oral lip lines
Detail Level: Detailed
Price (Use Numbers Only, No Special Characters Or $): 651
Forehead Units: 5
Lot #: [id number]
Glabellar Complex Units: 20
Show Lcl Units: No
Consent obtained through EMA charting. Risks include but not limited to lid/brow ptosis, bruising, swelling, diplopia, temporary effect, incomplete chemical denervation.\\n\\nBotox administered per written protocol per Melodie Carrizales MD.\\n\\nPhotos taken prior to injection and post injections.
Additional Area 3 Location: Brow Lift
Dilution (U/0.1 Cc): 4
Post-Care Instructions: Instructed to not lie down for 4 hours, do not massage areas injected, avoid headbands/hats if forehead was treated, and limit physical activity and extreme heat for 24 hours.
Additional Area 6 Location: Chin
Additional Area 2 Location: Upper Jaqueline-Oral Lines
Comments: Patient encouraged to come back in 2 weeks for touch up if needed.\\n\\n Patient agrees with plan and verbalizes understanding.
Additional Area 5 Location: Infraorbital eyelids
Incrementing Botox Units: By 0.5 Units
Additional Area 1 Location: Lateral brow lift

## 2023-04-26 ENCOUNTER — APPOINTMENT (RX ONLY)
Dept: URBAN - METROPOLITAN AREA CLINIC 1 | Facility: CLINIC | Age: 58
Setting detail: DERMATOLOGY
End: 2023-04-26

## 2023-04-26 DIAGNOSIS — Z41.9 ENCOUNTER FOR PROCEDURE FOR PURPOSES OTHER THAN REMEDYING HEALTH STATE, UNSPECIFIED: ICD-10-CM

## 2023-04-26 PROCEDURE — ? COSMETIC FOLLOW-UP

## 2023-04-26 PROCEDURE — ? BOTOX

## 2023-04-26 ASSESSMENT — LOCATION ZONE DERM: LOCATION ZONE: FACE

## 2023-04-26 ASSESSMENT — LOCATION DETAILED DESCRIPTION DERM
LOCATION DETAILED: LEFT INFERIOR FOREHEAD
LOCATION DETAILED: RIGHT LATERAL FOREHEAD
LOCATION DETAILED: LEFT SUPERIOR MEDIAL FOREHEAD
LOCATION DETAILED: LEFT SUPERIOR FOREHEAD
LOCATION DETAILED: LEFT LATERAL FOREHEAD
LOCATION DETAILED: RIGHT SUPERIOR FOREHEAD

## 2023-04-26 ASSESSMENT — LOCATION SIMPLE DESCRIPTION DERM
LOCATION SIMPLE: LEFT FOREHEAD
LOCATION SIMPLE: RIGHT FOREHEAD

## 2023-04-26 NOTE — PROCEDURE: COSMETIC FOLLOW-UP
Patient Satisfaction: Unsure
Detail Level: Zone
Treatment (Optional): Botox
Side Effects Or Complications: Under correction
Global Improvement: Good

## 2023-04-26 NOTE — PROCEDURE: BOTOX
Show Forehead Units: Yes
Additional Area 1 Units: 0
Lot #: [id number]
Additional Area 3 Location: Brow Lift
Show Right And Left Periorbital Units: No
Consent obtained through EMA charting. Risks include but not limited to lid/brow ptosis, bruising, swelling, diplopia, temporary effect, incomplete chemical denervation.\\n\\nBotox administered per written protocol per Melodie Carrizales MD.\\n\\nPhotos taken prior to injection and post injections.
Additional Area 6 Location: Chin
Additional Area 2 Location: Upper Jaqueline-Oral Lines
Dilution (U/0.1 Cc): 4
Comments: Patient encouraged to come back in 2 weeks for touch up if needed.\\n\\n Patient agrees with plan and verbalizes understanding.
Additional Area 5 Location: Infraorbital eyelids
Post-Care Instructions: Instructed to not lie down for 4 hours, do not massage areas injected, avoid headbands/hats if forehead was treated, and limit physical activity and extreme heat for 24 hours.
Additional Area 1 Location: Lateral brow lift
Incrementing Botox Units: By 0.5 Units
Additional Area 4 Location: Jaqueline-oral lip lines
Forehead Units: 5
Patient Specific Comments (Will Not Stick From Patient To Patient): Patient here for 3 week botox touch up, she is still having a small amount of movement from her left  belly and forehead. Suggested adding 2 units to left  and 5 units across the frontalis. \\n\\nPatient inquired about right glabella rhytid, advised patient microneeding and fraxel would be appropriate treatment options to help with fine lines and wrinkles.
Detail Level: Detailed
Glabellar Complex Units: 2
Expiration Date (Month Year): 04/2025

## 2023-05-04 ENCOUNTER — APPOINTMENT (RX ONLY)
Dept: URBAN - NONMETROPOLITAN AREA CLINIC 13 | Facility: CLINIC | Age: 58
Setting detail: DERMATOLOGY
End: 2023-05-04

## 2023-05-04 DIAGNOSIS — L81.4 OTHER MELANIN HYPERPIGMENTATION: ICD-10-CM

## 2023-05-04 PROCEDURE — ? OBSERVATION AND MEASURE

## 2023-05-04 PROCEDURE — ? PHOTO-DOCUMENTATION

## 2023-05-04 PROCEDURE — 99212 OFFICE O/P EST SF 10 MIN: CPT

## 2023-05-04 ASSESSMENT — LOCATION DETAILED DESCRIPTION DERM: LOCATION DETAILED: RIGHT DISTAL RADIAL DORSAL FOREARM

## 2023-05-04 ASSESSMENT — LOCATION ZONE DERM: LOCATION ZONE: ARM

## 2023-05-04 ASSESSMENT — LOCATION SIMPLE DESCRIPTION DERM: LOCATION SIMPLE: RIGHT FOREARM

## 2023-05-04 NOTE — PROCEDURE: PHOTO-DOCUMENTATION
Detail Level: Zone
Photo Preface (Leave Blank If You Do Not Want): * Photographs were compared today and no changes noted. Cont to watch for changes.

## 2023-07-20 ENCOUNTER — APPOINTMENT (RX ONLY)
Dept: URBAN - NONMETROPOLITAN AREA CLINIC 13 | Facility: CLINIC | Age: 58
Setting detail: DERMATOLOGY
End: 2023-07-20

## 2023-07-20 DIAGNOSIS — Z41.9 ENCOUNTER FOR PROCEDURE FOR PURPOSES OTHER THAN REMEDYING HEALTH STATE, UNSPECIFIED: ICD-10-CM

## 2023-07-20 PROCEDURE — ? BOTOX

## 2023-07-20 ASSESSMENT — LOCATION DETAILED DESCRIPTION DERM
LOCATION DETAILED: GLABELLA
LOCATION DETAILED: LEFT INFERIOR FOREHEAD
LOCATION DETAILED: RIGHT INFERIOR MEDIAL FOREHEAD
LOCATION DETAILED: LEFT SUPERIOR FOREHEAD
LOCATION DETAILED: LEFT LATERAL FOREHEAD
LOCATION DETAILED: LEFT INFERIOR MEDIAL FOREHEAD
LOCATION DETAILED: RIGHT SUPERIOR FOREHEAD
LOCATION DETAILED: SUPERIOR MID FOREHEAD
LOCATION DETAILED: RIGHT LATERAL FOREHEAD
LOCATION DETAILED: RIGHT INFERIOR FOREHEAD

## 2023-07-20 ASSESSMENT — LOCATION SIMPLE DESCRIPTION DERM
LOCATION SIMPLE: RIGHT FOREHEAD
LOCATION SIMPLE: GLABELLA
LOCATION SIMPLE: LEFT FOREHEAD
LOCATION SIMPLE: SUPERIOR FOREHEAD

## 2023-07-20 ASSESSMENT — LOCATION ZONE DERM: LOCATION ZONE: FACE

## 2023-07-20 NOTE — PROCEDURE: BOTOX
Additional Area 4 Location: Jaqueline-oral lip lines
Left Pupillary Line Units: 0
Additional Area 6 Location: Chin
Show Mentalis Units: No
Detail Level: Detailed
Dilution (U/0.1 Cc): 4
Show Additional Area 6: Yes
Post-Care Instructions: Instructed to not lie down for 4 hours, do not massage areas injected, avoid headbands/hats if forehead was treated, and limit physical activity and extreme heat for 24 hours.
Additional Area 5 Location: Infraorbital eyelids
Comments: Patient encouraged to come back in 2 weeks for touch up if needed.\\n\\n Patient agrees with plan and verbalizes understanding.
Additional Area 3 Location: Brow Lift
Expiration Date (Month Year): 12/2025
Forehead Units: 10
Additional Area 2 Location: Upper Jaqueline-Oral Lines
Incrementing Botox Units: By 0.5 Units
Glabellar Complex Units: 22
Price (Use Numbers Only, No Special Characters Or $): 415
Lot #: [id number]
Consent obtained through EMA charting. Risks include but not limited to lid/brow ptosis, bruising, swelling, diplopia, temporary effect, incomplete chemical denervation.\\n\\nBotox administered per written protocol per Melodie Carrizales MD.\\n\\nPhotos taken prior to injections.
Additional Area 1 Location: Lateral brow lift

## 2023-09-20 ENCOUNTER — APPOINTMENT (RX ONLY)
Dept: URBAN - NONMETROPOLITAN AREA CLINIC 4 | Facility: CLINIC | Age: 58
Setting detail: DERMATOLOGY
End: 2023-09-20

## 2023-09-20 DIAGNOSIS — Z71.89 OTHER SPECIFIED COUNSELING: ICD-10-CM

## 2023-09-20 DIAGNOSIS — Z85.820 PERSONAL HISTORY OF MALIGNANT MELANOMA OF SKIN: ICD-10-CM

## 2023-09-20 DIAGNOSIS — D18.0 HEMANGIOMA: ICD-10-CM

## 2023-09-20 DIAGNOSIS — L82.0 INFLAMED SEBORRHEIC KERATOSIS: ICD-10-CM

## 2023-09-20 DIAGNOSIS — L81.4 OTHER MELANIN HYPERPIGMENTATION: ICD-10-CM

## 2023-09-20 DIAGNOSIS — L82.1 OTHER SEBORRHEIC KERATOSIS: ICD-10-CM

## 2023-09-20 DIAGNOSIS — D22 MELANOCYTIC NEVI: ICD-10-CM

## 2023-09-20 PROBLEM — D22.9 MELANOCYTIC NEVI, UNSPECIFIED: Status: ACTIVE | Noted: 2023-09-20

## 2023-09-20 PROBLEM — D18.01 HEMANGIOMA OF SKIN AND SUBCUTANEOUS TISSUE: Status: ACTIVE | Noted: 2023-09-20

## 2023-09-20 PROCEDURE — 99213 OFFICE O/P EST LOW 20 MIN: CPT | Mod: 25

## 2023-09-20 PROCEDURE — 17110 DESTRUCTION B9 LES UP TO 14: CPT

## 2023-09-20 PROCEDURE — ? FULL BODY SKIN EXAM

## 2023-09-20 PROCEDURE — ? SUNSCREEN RECOMMENDATIONS

## 2023-09-20 PROCEDURE — ? COUNSELING

## 2023-09-20 PROCEDURE — ? DEFER

## 2023-09-20 PROCEDURE — ? LIQUID NITROGEN

## 2023-09-20 ASSESSMENT — LOCATION DETAILED DESCRIPTION DERM
LOCATION DETAILED: RIGHT SUPERIOR UPPER BACK
LOCATION DETAILED: LEFT SUPERIOR LATERAL NECK
LOCATION DETAILED: LEFT LATERAL BREAST 1-2:00 REGION
LOCATION DETAILED: EPIGASTRIC SKIN
LOCATION DETAILED: LEFT VENTRAL PROXIMAL FOREARM
LOCATION DETAILED: RIGHT INFERIOR MEDIAL UPPER BACK
LOCATION DETAILED: RIGHT VENTRAL PROXIMAL FOREARM
LOCATION DETAILED: LEFT SUPERIOR UPPER BACK
LOCATION DETAILED: UPPER STERNUM

## 2023-09-20 ASSESSMENT — LOCATION SIMPLE DESCRIPTION DERM
LOCATION SIMPLE: LEFT BREAST
LOCATION SIMPLE: NECK
LOCATION SIMPLE: RIGHT UPPER BACK
LOCATION SIMPLE: CHEST
LOCATION SIMPLE: LEFT UPPER BACK
LOCATION SIMPLE: LEFT FOREARM
LOCATION SIMPLE: ABDOMEN
LOCATION SIMPLE: RIGHT FOREARM

## 2023-09-20 ASSESSMENT — LOCATION ZONE DERM
LOCATION ZONE: NECK
LOCATION ZONE: ARM
LOCATION ZONE: TRUNK

## 2023-09-20 NOTE — PROCEDURE: LIQUID NITROGEN
Post-Care Instructions: I reviewed with the patient in detail post-care instructions. Patient is to wear sunprotection, and avoid picking at any of the treated lesions. Pt may apply Vaseline to crusted or scabbing areas.
Spray Paint Text: The liquid nitrogen was applied to the skin utilizing a spray paint frosting technique.
Show Spray Paint Technique Variable?: Yes
Add 52 Modifier (Optional): no
Consent: The patient's consent was obtained including but not limited to risks of crusting, scabbing, blistering, scarring, darker or lighter pigmentary change, recurrence, incomplete removal and infection.
Number Of Freeze-Thaw Cycles: 1 freeze-thaw cycle
Medical Necessity Information: It is in your best interest to select a reason for this procedure from the list below. All of these items fulfill various CMS LCD requirements except the new and changing color options.
Medical Necessity Clause: This procedure was medically necessary because the lesions that were treated were:
Detail Level: Zone
Duration Of Freeze Thaw-Cycle (Seconds): 5-10

## 2023-09-20 NOTE — PROCEDURE: DEFER
Size Of Lesion In Cm (Optional): 0
Detail Level: Detailed
Introduction Text (Please End With A Colon): The following procedure was deferred:liquid nitrogen

## 2023-10-24 ENCOUNTER — APPOINTMENT (RX ONLY)
Dept: URBAN - NONMETROPOLITAN AREA CLINIC 13 | Facility: CLINIC | Age: 58
Setting detail: DERMATOLOGY
End: 2023-10-24

## 2023-10-24 DIAGNOSIS — Z41.9 ENCOUNTER FOR PROCEDURE FOR PURPOSES OTHER THAN REMEDYING HEALTH STATE, UNSPECIFIED: ICD-10-CM

## 2023-10-24 PROCEDURE — ? BOTOX

## 2023-10-24 PROCEDURE — ? COSMETIC CONSULTATION: BOTOX

## 2023-10-24 ASSESSMENT — LOCATION SIMPLE DESCRIPTION DERM
LOCATION SIMPLE: RIGHT FOREHEAD
LOCATION SIMPLE: LEFT FOREHEAD
LOCATION SIMPLE: GLABELLA

## 2023-10-24 ASSESSMENT — LOCATION DETAILED DESCRIPTION DERM
LOCATION DETAILED: LEFT INFERIOR FOREHEAD
LOCATION DETAILED: RIGHT SUPERIOR FOREHEAD
LOCATION DETAILED: GLABELLA
LOCATION DETAILED: RIGHT LATERAL FOREHEAD
LOCATION DETAILED: RIGHT INFERIOR FOREHEAD
LOCATION DETAILED: LEFT LATERAL FOREHEAD
LOCATION DETAILED: LEFT SUPERIOR FOREHEAD
LOCATION DETAILED: RIGHT INFERIOR MEDIAL FOREHEAD
LOCATION DETAILED: LEFT INFERIOR MEDIAL FOREHEAD
LOCATION DETAILED: LEFT MEDIAL FOREHEAD

## 2023-10-24 ASSESSMENT — LOCATION ZONE DERM: LOCATION ZONE: FACE

## 2023-10-24 NOTE — PROCEDURE: BOTOX
Depressor Anguli Oris Units: 0
Show Mentalis Units: No
Comments: Patient encouraged to come back in 2 weeks for touch up if needed.\\n\\n Patient agrees with plan and verbalizes understanding.
Show Levator Superior Units: Yes
Additional Area 3 Location: Brow Lift
Additional Area 2 Location: Upper Jaqueline-Oral Lines
Glabellar Complex Units: 22
Lot #: [id number]
Post-Care Instructions: Instructed to not lie down for 4 hours, do not massage areas injected, avoid headbands/hats if forehead was treated, and limit physical activity and extreme heat for 24 hours.
Incrementing Botox Units: By 0.5 Units
Detail Level: Detailed
Expiration Date (Month Year): 04/2026
Additional Area 5 Location: Infraorbital eyelids
Dilution (U/0.1 Cc): 4
Price (Use Numbers Only, No Special Characters Or $): 416
Additional Area 6 Location: Prachi lift
Consent obtained through EMA charting. Risks include but not limited to lid/brow ptosis, bruising, swelling, diplopia, temporary effect, incomplete chemical denervation.\\n\\nBotox administered per written protocol per Melodie Carrizales MD.\\n\\nPhotos taken prior to injections.
Forehead Units: 10
Additional Area 4 Location: Right forehead

## 2023-10-25 ENCOUNTER — APPOINTMENT (RX ONLY)
Dept: URBAN - METROPOLITAN AREA CLINIC 1 | Facility: CLINIC | Age: 58
Setting detail: DERMATOLOGY
End: 2023-10-25

## 2023-10-25 DIAGNOSIS — L81.4 OTHER MELANIN HYPERPIGMENTATION: ICD-10-CM

## 2023-10-25 DIAGNOSIS — L30.9 DERMATITIS, UNSPECIFIED: ICD-10-CM

## 2023-10-25 PROCEDURE — ? OBSERVATION AND MEASURE

## 2023-10-25 PROCEDURE — 99214 OFFICE O/P EST MOD 30 MIN: CPT

## 2023-10-25 PROCEDURE — ? COUNSELING

## 2023-10-25 PROCEDURE — ? PHOTO-DOCUMENTATION

## 2023-10-25 PROCEDURE — ? PRESCRIPTION

## 2023-10-25 RX ORDER — TRIAMCINOLONE ACETONIDE 1 MG/G
CREAM TOPICAL BID
Qty: 80 | Refills: 1 | Status: ERX | COMMUNITY
Start: 2023-10-25

## 2023-10-25 RX ADMIN — TRIAMCINOLONE ACETONIDE AAA: 1 CREAM TOPICAL at 00:00

## 2023-10-25 ASSESSMENT — LOCATION SIMPLE DESCRIPTION DERM
LOCATION SIMPLE: LEFT UPPER BACK
LOCATION SIMPLE: RIGHT FOREARM
LOCATION SIMPLE: RIGHT UPPER BACK

## 2023-10-25 ASSESSMENT — LOCATION DETAILED DESCRIPTION DERM
LOCATION DETAILED: LEFT SUPERIOR UPPER BACK
LOCATION DETAILED: RIGHT SUPERIOR UPPER BACK
LOCATION DETAILED: RIGHT DISTAL RADIAL DORSAL FOREARM
LOCATION DETAILED: RIGHT DISTAL DORSAL FOREARM

## 2023-10-25 ASSESSMENT — LOCATION ZONE DERM
LOCATION ZONE: ARM
LOCATION ZONE: TRUNK

## 2023-10-25 NOTE — PROCEDURE: COUNSELING
Patient Specific Counseling (Will Not Stick From Patient To Patient): >>\\n\\nSize and morph unchanged on exam
Detail Level: Detailed

## 2023-11-29 ENCOUNTER — APPOINTMENT (RX ONLY)
Dept: URBAN - NONMETROPOLITAN AREA CLINIC 4 | Facility: CLINIC | Age: 58
Setting detail: DERMATOLOGY
End: 2023-11-29

## 2023-11-29 DIAGNOSIS — L82.0 INFLAMED SEBORRHEIC KERATOSIS: ICD-10-CM

## 2023-11-29 PROCEDURE — ? COUNSELING

## 2023-11-29 PROCEDURE — ? LIQUID NITROGEN

## 2023-11-29 PROCEDURE — 17110 DESTRUCTION B9 LES UP TO 14: CPT

## 2023-11-29 ASSESSMENT — LOCATION SIMPLE DESCRIPTION DERM: LOCATION SIMPLE: LEFT BREAST

## 2023-11-29 ASSESSMENT — LOCATION DETAILED DESCRIPTION DERM
LOCATION DETAILED: LEFT LATERAL BREAST 12-1:00 REGION
LOCATION DETAILED: LEFT MEDIAL BREAST 11-12:00 REGION

## 2023-11-29 ASSESSMENT — LOCATION ZONE DERM: LOCATION ZONE: TRUNK

## 2023-11-29 NOTE — PROCEDURE: LIQUID NITROGEN
Show Spray Paint Technique Variable?: Yes
Detail Level: Zone
Number Of Freeze-Thaw Cycles: 1 freeze-thaw cycle
Add 52 Modifier (Optional): no
Spray Paint Text: The liquid nitrogen was applied to the skin utilizing a spray paint frosting technique.
Consent: The patient's consent was obtained including but not limited to risks of crusting, scabbing, blistering, scarring, darker or lighter pigmentary change, recurrence, incomplete removal and infection.
Duration Of Freeze Thaw-Cycle (Seconds): 5-10
Medical Necessity Clause: This procedure was medically necessary because the lesions that were treated were:
Medical Necessity Information: It is in your best interest to select a reason for this procedure from the list below. All of these items fulfill various CMS LCD requirements except the new and changing color options.
Post-Care Instructions: I reviewed with the patient in detail post-care instructions. Patient is to wear sunprotection, and avoid picking at any of the treated lesions. Pt may apply Vaseline to crusted or scabbing areas.

## 2024-01-24 ENCOUNTER — APPOINTMENT (RX ONLY)
Dept: URBAN - METROPOLITAN AREA CLINIC 1 | Facility: CLINIC | Age: 59
Setting detail: DERMATOLOGY
End: 2024-01-24

## 2024-01-24 DIAGNOSIS — Z41.9 ENCOUNTER FOR PROCEDURE FOR PURPOSES OTHER THAN REMEDYING HEALTH STATE, UNSPECIFIED: ICD-10-CM

## 2024-01-24 PROCEDURE — ? BOTOX

## 2024-01-24 ASSESSMENT — LOCATION SIMPLE DESCRIPTION DERM
LOCATION SIMPLE: LEFT EYEBROW
LOCATION SIMPLE: RIGHT EYEBROW
LOCATION SIMPLE: GLABELLA
LOCATION SIMPLE: LEFT FOREHEAD
LOCATION SIMPLE: RIGHT FOREHEAD

## 2024-01-24 ASSESSMENT — LOCATION DETAILED DESCRIPTION DERM
LOCATION DETAILED: LEFT SUPERIOR MEDIAL FOREHEAD
LOCATION DETAILED: RIGHT CENTRAL EYEBROW
LOCATION DETAILED: LEFT SUPERIOR FOREHEAD
LOCATION DETAILED: RIGHT LATERAL FOREHEAD
LOCATION DETAILED: LEFT LATERAL FOREHEAD
LOCATION DETAILED: LEFT CENTRAL EYEBROW
LOCATION DETAILED: LEFT MEDIAL EYEBROW
LOCATION DETAILED: RIGHT SUPERIOR FOREHEAD
LOCATION DETAILED: RIGHT MEDIAL EYEBROW
LOCATION DETAILED: GLABELLA

## 2024-01-24 ASSESSMENT — LOCATION ZONE DERM: LOCATION ZONE: FACE

## 2024-01-24 NOTE — PROCEDURE: BOTOX
Nasal Root Units: 0
Show Forehead Units: Yes
Expiration Date (Month Year): 04/2026
Additional Area 2 Location: Upper Jaqueline-Oral Lines
Additional Area 6 Location: Prachi lift
Show Ucl Units: No
Incrementing Botox Units: By 0.5 Units
Price (Use Numbers Only, No Special Characters Or $): 416
Additional Area 5 Location: Infraorbital eyelids
Consent obtained through EMA charting. Risks include but not limited to lid/brow ptosis, bruising, swelling, diplopia, temporary effect, incomplete chemical denervation.\\n\\nBotox administered per written protocol per Melodie Carrizales MD.\\n\\nPhotos taken prior to injections.
Additional Area 1 Location: Lateral brow lift
Detail Level: Detailed
Dilution (U/0.1 Cc): 4
Forehead Units: 10
Glabellar Complex Units: 22
Additional Area 4 Location: Chin
Post-Care Instructions: Instructed to not lie down for 4 hours, do not massage areas injected, avoid headbands/hats if forehead was treated, and limit physical activity and extreme heat for 24 hours.
Lot #: [id number]
Comments: Patient encouraged to come back in 2 weeks for touch up if needed.\\n\\n Patient agrees with plan and verbalizes understanding.
Additional Area 3 Location: Right forehead above right brow

## 2024-04-23 ENCOUNTER — APPOINTMENT (RX ONLY)
Dept: URBAN - NONMETROPOLITAN AREA CLINIC 13 | Facility: CLINIC | Age: 59
Setting detail: DERMATOLOGY
End: 2024-04-23

## 2024-04-23 DIAGNOSIS — Z41.9 ENCOUNTER FOR PROCEDURE FOR PURPOSES OTHER THAN REMEDYING HEALTH STATE, UNSPECIFIED: ICD-10-CM

## 2024-04-23 PROCEDURE — ? BOTOX

## 2024-04-23 ASSESSMENT — LOCATION DETAILED DESCRIPTION DERM
LOCATION DETAILED: LEFT MEDIAL EYEBROW
LOCATION DETAILED: SUPERIOR MID FOREHEAD
LOCATION DETAILED: RIGHT MEDIAL EYEBROW
LOCATION DETAILED: LEFT FOREHEAD
LOCATION DETAILED: RIGHT CENTRAL EYEBROW
LOCATION DETAILED: LEFT CENTRAL EYEBROW
LOCATION DETAILED: RIGHT LATERAL FOREHEAD
LOCATION DETAILED: GLABELLA
LOCATION DETAILED: LEFT SUPERIOR FOREHEAD
LOCATION DETAILED: RIGHT SUPERIOR FOREHEAD

## 2024-04-23 ASSESSMENT — LOCATION ZONE DERM: LOCATION ZONE: FACE

## 2024-04-23 ASSESSMENT — LOCATION SIMPLE DESCRIPTION DERM
LOCATION SIMPLE: RIGHT EYEBROW
LOCATION SIMPLE: GLABELLA
LOCATION SIMPLE: LEFT FOREHEAD
LOCATION SIMPLE: SUPERIOR FOREHEAD
LOCATION SIMPLE: RIGHT FOREHEAD
LOCATION SIMPLE: LEFT EYEBROW

## 2024-04-23 NOTE — PROCEDURE: BOTOX
Post-Care Instructions: Instructed to not lie down for 4 hours, do not massage areas injected, avoid headbands/hats if forehead was treated, and limit physical activity and extreme heat for 24 hours.
Show Ucl Units: No
Additional Area 4 Units: 0
Additional Area 6 Location: Prachi lift
Show Periorbital Units: Yes
Additional Area 2 Location: Upper Jaqueline-Oral Lines
Comments: Patient encouraged to come back in 2 weeks for touch up if needed.\\n\\n Patient agrees with plan and verbalizes understanding.
Additional Area 5 Location: Infraorbital eyelids
Incrementing Botox Units: By 0.5 Units
Additional Area 1 Location: Lateral brow lift
Expiration Date (Month Year): 05/2026
Additional Area 4 Location: Chin
Price (Use Numbers Only, No Special Characters Or $): 416
Detail Level: Detailed
Lot #: [id number]
Forehead Units: 10
Glabellar Complex Units: 22
Additional Area 3 Location: lower fan crows feet
Consent obtained through EMA charting. Risks include but not limited to lid/brow ptosis, bruising, swelling, diplopia, temporary effect, incomplete chemical denervation.\\n\\nBotox administered per written protocol per Melodie Carrizales MD.\\n\\nPhotos taken prior to injections.
Dilution (U/0.1 Cc): 4

## 2024-05-06 ENCOUNTER — APPOINTMENT (RX ONLY)
Dept: URBAN - NONMETROPOLITAN AREA CLINIC 4 | Facility: CLINIC | Age: 59
Setting detail: DERMATOLOGY
End: 2024-05-06

## 2024-05-06 DIAGNOSIS — L82.1 OTHER SEBORRHEIC KERATOSIS: ICD-10-CM

## 2024-05-06 DIAGNOSIS — Z71.89 OTHER SPECIFIED COUNSELING: ICD-10-CM

## 2024-05-06 DIAGNOSIS — Z85.820 PERSONAL HISTORY OF MALIGNANT MELANOMA OF SKIN: ICD-10-CM

## 2024-05-06 DIAGNOSIS — L82.0 INFLAMED SEBORRHEIC KERATOSIS: ICD-10-CM

## 2024-05-06 DIAGNOSIS — D22 MELANOCYTIC NEVI: ICD-10-CM | Status: STABLE

## 2024-05-06 DIAGNOSIS — D18.0 HEMANGIOMA: ICD-10-CM | Status: STABLE

## 2024-05-06 DIAGNOSIS — L81.4 OTHER MELANIN HYPERPIGMENTATION: ICD-10-CM

## 2024-05-06 PROBLEM — D18.01 HEMANGIOMA OF SKIN AND SUBCUTANEOUS TISSUE: Status: ACTIVE | Noted: 2024-05-06

## 2024-05-06 PROBLEM — D22.5 MELANOCYTIC NEVI OF TRUNK: Status: ACTIVE | Noted: 2024-05-06

## 2024-05-06 PROBLEM — D22.4 MELANOCYTIC NEVI OF SCALP AND NECK: Status: ACTIVE | Noted: 2024-05-06

## 2024-05-06 PROCEDURE — ? COUNSELING

## 2024-05-06 PROCEDURE — ? OBSERVATION

## 2024-05-06 PROCEDURE — 99213 OFFICE O/P EST LOW 20 MIN: CPT

## 2024-05-06 PROCEDURE — ? DEFER

## 2024-05-06 PROCEDURE — ? FULL BODY SKIN EXAM

## 2024-05-06 PROCEDURE — ? SUNSCREEN RECOMMENDATIONS

## 2024-05-06 ASSESSMENT — LOCATION DETAILED DESCRIPTION DERM
LOCATION DETAILED: LEFT SUPERIOR MEDIAL UPPER BACK
LOCATION DETAILED: RIGHT POSTERIOR SHOULDER
LOCATION DETAILED: LEFT INFERIOR LATERAL NECK
LOCATION DETAILED: RIGHT ANTERIOR PROXIMAL UPPER ARM
LOCATION DETAILED: UPPER STERNUM
LOCATION DETAILED: LEFT MEDIAL TRAPEZIAL NECK
LOCATION DETAILED: LEFT SUPERIOR LATERAL NECK
LOCATION DETAILED: LEFT INFERIOR ANTERIOR NECK
LOCATION DETAILED: SUPERIOR THORACIC SPINE
LOCATION DETAILED: EPIGASTRIC SKIN
LOCATION DETAILED: LEFT MEDIAL UPPER BACK
LOCATION DETAILED: RIGHT MID-UPPER BACK
LOCATION DETAILED: RIGHT INFERIOR LATERAL NECK
LOCATION DETAILED: RIGHT SUPERIOR UPPER BACK
LOCATION DETAILED: LEFT INFERIOR LATERAL NECK

## 2024-05-06 ASSESSMENT — LOCATION SIMPLE DESCRIPTION DERM
LOCATION SIMPLE: LEFT UPPER BACK
LOCATION SIMPLE: RIGHT SHOULDER
LOCATION SIMPLE: RIGHT UPPER ARM
LOCATION SIMPLE: RIGHT ANTERIOR NECK
LOCATION SIMPLE: RIGHT UPPER BACK
LOCATION SIMPLE: CHEST
LOCATION SIMPLE: POSTERIOR NECK
LOCATION SIMPLE: ABDOMEN
LOCATION SIMPLE: LEFT ANTERIOR NECK
LOCATION SIMPLE: UPPER BACK
LOCATION SIMPLE: LEFT ANTERIOR NECK

## 2024-05-06 ASSESSMENT — LOCATION ZONE DERM
LOCATION ZONE: TRUNK
LOCATION ZONE: NECK
LOCATION ZONE: TRUNK
LOCATION ZONE: NECK
LOCATION ZONE: ARM

## 2024-05-06 NOTE — HPI: EVALUATION OF SKIN LESION(S)
What Type Of Note Output Would You Prefer (Optional)?: Standard Output
Hpi Title: Evaluation of Skin Lesions
How Severe Are Your Spot(S)?: mild
Have Your Spot(S) Been Treated In The Past?: has not been treated
Location: Left neck
Year Removed: 2021

## 2024-05-06 NOTE — PROCEDURE: OBSERVATION
Body Location Override (Optional - Billing Will Still Be Based On Selected Body Map Location If Applicable): left lateral superior neck
Detail Level: Detailed
Size Of Lesion In Cm (Optional): 0

## 2024-05-06 NOTE — PROCEDURE: DEFER
Introduction Text (Please End With A Colon): The following procedure was deferred:cryotherapy
Detail Level: Detailed
Size Of Lesion In Cm (Optional): 0
Procedure To Be Performed At Next Visit: Cryotherapy

## 2024-07-01 ENCOUNTER — APPOINTMENT (RX ONLY)
Dept: URBAN - NONMETROPOLITAN AREA CLINIC 13 | Facility: CLINIC | Age: 59
Setting detail: DERMATOLOGY
End: 2024-07-01

## 2024-07-01 DIAGNOSIS — L57.8 OTHER SKIN CHANGES DUE TO CHRONIC EXPOSURE TO NONIONIZING RADIATION: ICD-10-CM

## 2024-07-01 DIAGNOSIS — D485 NEOPLASM OF UNCERTAIN BEHAVIOR OF SKIN: ICD-10-CM

## 2024-07-01 DIAGNOSIS — D18.0 HEMANGIOMA: ICD-10-CM

## 2024-07-01 DIAGNOSIS — D22 MELANOCYTIC NEVI: ICD-10-CM

## 2024-07-01 DIAGNOSIS — L82.1 OTHER SEBORRHEIC KERATOSIS: ICD-10-CM

## 2024-07-01 PROBLEM — D48.5 NEOPLASM OF UNCERTAIN BEHAVIOR OF SKIN: Status: ACTIVE | Noted: 2024-07-01

## 2024-07-01 PROBLEM — D22.5 MELANOCYTIC NEVI OF TRUNK: Status: ACTIVE | Noted: 2024-07-01

## 2024-07-01 PROBLEM — D18.01 HEMANGIOMA OF SKIN AND SUBCUTANEOUS TISSUE: Status: ACTIVE | Noted: 2024-07-01

## 2024-07-01 PROCEDURE — 11102 TANGNTL BX SKIN SINGLE LES: CPT

## 2024-07-01 PROCEDURE — 99213 OFFICE O/P EST LOW 20 MIN: CPT | Mod: 25

## 2024-07-01 PROCEDURE — ? SUNSCREEN RECOMMENDATIONS

## 2024-07-01 PROCEDURE — ? BIOPSY BY SHAVE METHOD

## 2024-07-01 PROCEDURE — ? COUNSELING

## 2024-07-01 PROCEDURE — 11103 TANGNTL BX SKIN EA SEP/ADDL: CPT

## 2024-07-01 ASSESSMENT — LOCATION DETAILED DESCRIPTION DERM
LOCATION DETAILED: RIGHT SUPERIOR LATERAL UPPER BACK
LOCATION DETAILED: LEFT SUPERIOR UPPER BACK
LOCATION DETAILED: RIGHT DISTAL DORSAL FOREARM
LOCATION DETAILED: LEFT INFERIOR FOREHEAD
LOCATION DETAILED: LEFT DISTAL PRETIBIAL REGION

## 2024-07-01 ASSESSMENT — LOCATION ZONE DERM
LOCATION ZONE: LEG
LOCATION ZONE: FACE
LOCATION ZONE: TRUNK
LOCATION ZONE: ARM

## 2024-07-01 ASSESSMENT — LOCATION SIMPLE DESCRIPTION DERM
LOCATION SIMPLE: LEFT FOREHEAD
LOCATION SIMPLE: LEFT UPPER BACK
LOCATION SIMPLE: LEFT PRETIBIAL REGION
LOCATION SIMPLE: RIGHT FOREARM
LOCATION SIMPLE: RIGHT BACK

## 2024-07-09 ENCOUNTER — APPOINTMENT (RX ONLY)
Dept: URBAN - NONMETROPOLITAN AREA CLINIC 13 | Facility: CLINIC | Age: 59
Setting detail: DERMATOLOGY
End: 2024-07-09

## 2024-07-09 DIAGNOSIS — Z41.9 ENCOUNTER FOR PROCEDURE FOR PURPOSES OTHER THAN REMEDYING HEALTH STATE, UNSPECIFIED: ICD-10-CM

## 2024-07-09 PROCEDURE — ? BOTOX

## 2024-07-09 ASSESSMENT — LOCATION DETAILED DESCRIPTION DERM
LOCATION DETAILED: LEFT SUPERIOR FOREHEAD
LOCATION DETAILED: RIGHT SUPERIOR FOREHEAD
LOCATION DETAILED: LEFT MEDIAL FOREHEAD
LOCATION DETAILED: LEFT CENTRAL EYEBROW
LOCATION DETAILED: LEFT MEDIAL EYEBROW
LOCATION DETAILED: GLABELLA
LOCATION DETAILED: RIGHT CENTRAL EYEBROW
LOCATION DETAILED: LEFT LATERAL FOREHEAD
LOCATION DETAILED: RIGHT LATERAL FOREHEAD
LOCATION DETAILED: RIGHT MEDIAL EYEBROW

## 2024-07-09 ASSESSMENT — LOCATION SIMPLE DESCRIPTION DERM
LOCATION SIMPLE: LEFT FOREHEAD
LOCATION SIMPLE: RIGHT FOREHEAD
LOCATION SIMPLE: LEFT EYEBROW
LOCATION SIMPLE: GLABELLA
LOCATION SIMPLE: RIGHT EYEBROW

## 2024-07-09 ASSESSMENT — LOCATION ZONE DERM: LOCATION ZONE: FACE

## 2024-07-09 NOTE — PROCEDURE: BOTOX
Additional Area 2 Units: 0
Show Glabellar Units: Yes
Glabellar Complex Units: 22
Show Right And Left Brow Units: No
Forehead Units: 10
Post-Care Instructions: Instructed to not lie down for 4 hours, do not massage areas injected, avoid headbands/hats if forehead was treated, and limit physical activity and extreme heat for 24 hours.
Lot #: [id number]
Comments: Patient encouraged to come back in 2 weeks for touch up if needed.\\n\\n Patient agrees with plan and verbalizes understanding.
Additional Area 3 Location: Chin
Additional Area 6 Location: Prachi lift
Expiration Date (Month Year): 06/2026
Additional Area 2 Location: Left anterior platysmal band
Incrementing Botox Units: By 0.5 Units
Additional Area 5 Location: Infraorbital eyelids
Price (Use Numbers Only, No Special Characters Or $): 152
Additional Area 1 Location: brow lift
Detail Level: Detailed
Consent obtained through EMA charting. Risks include but not limited to lid/brow ptosis, bruising, swelling, diplopia, temporary effect, incomplete chemical denervation.\\n\\nBotox administered per written protocol per Melodie Carrizales MD.\\n\\nPhotos taken prior to injections.
Dilution (U/0.1 Cc): 4
Additional Area 4 Location: Upper arturo-oral rhytids

## 2024-07-15 ENCOUNTER — APPOINTMENT (RX ONLY)
Dept: URBAN - NONMETROPOLITAN AREA CLINIC 4 | Facility: CLINIC | Age: 59
Setting detail: DERMATOLOGY
End: 2024-07-15

## 2024-07-15 DIAGNOSIS — Z02.9 ENCOUNTER FOR ADMINISTRATIVE EXAMINATIONS, UNSPECIFIED: ICD-10-CM

## 2024-07-29 ENCOUNTER — APPOINTMENT (RX ONLY)
Dept: URBAN - NONMETROPOLITAN AREA CLINIC 13 | Facility: CLINIC | Age: 59
Setting detail: DERMATOLOGY
End: 2024-07-29

## 2024-07-29 PROBLEM — C44.719 BASAL CELL CARCINOMA OF SKIN OF LEFT LOWER LIMB, INCLUDING HIP: Status: ACTIVE | Noted: 2024-07-29

## 2024-07-29 PROCEDURE — 17262 DSTRJ MAL LES T/A/L 1.1-2.0: CPT

## 2024-07-29 PROCEDURE — ? CURETTAGE AND DESTRUCTION

## 2024-12-18 ENCOUNTER — APPOINTMENT (OUTPATIENT)
Dept: URBAN - NONMETROPOLITAN AREA CLINIC 13 | Facility: CLINIC | Age: 59
Setting detail: DERMATOLOGY
End: 2024-12-18

## 2024-12-18 DIAGNOSIS — Z41.9 ENCOUNTER FOR PROCEDURE FOR PURPOSES OTHER THAN REMEDYING HEALTH STATE, UNSPECIFIED: ICD-10-CM

## 2024-12-18 PROCEDURE — ? COSMETIC CONSULTATION: BOTOX

## 2024-12-18 PROCEDURE — ? BOTOX

## 2024-12-18 ASSESSMENT — LOCATION DETAILED DESCRIPTION DERM
LOCATION DETAILED: LEFT SUPERIOR FOREHEAD
LOCATION DETAILED: LEFT CENTRAL EYEBROW
LOCATION DETAILED: RIGHT MEDIAL EYEBROW
LOCATION DETAILED: GLABELLA
LOCATION DETAILED: LEFT FOREHEAD
LOCATION DETAILED: RIGHT SUPERIOR FOREHEAD
LOCATION DETAILED: MEDIAL FRONTAL SCALP
LOCATION DETAILED: RIGHT FOREHEAD
LOCATION DETAILED: RIGHT CENTRAL EYEBROW
LOCATION DETAILED: LEFT MEDIAL EYEBROW

## 2024-12-18 ASSESSMENT — LOCATION SIMPLE DESCRIPTION DERM
LOCATION SIMPLE: RIGHT EYEBROW
LOCATION SIMPLE: FRONTAL SCALP
LOCATION SIMPLE: LEFT FOREHEAD
LOCATION SIMPLE: GLABELLA
LOCATION SIMPLE: LEFT EYEBROW
LOCATION SIMPLE: RIGHT FOREHEAD

## 2024-12-18 ASSESSMENT — LOCATION ZONE DERM
LOCATION ZONE: SCALP
LOCATION ZONE: FACE

## 2024-12-18 NOTE — PROCEDURE: BOTOX
Show Mentalis Units: No
Left Periorbital Skin Units: 0
Show Additional Area 1: Yes
Comments: Patient encouraged to come back in 2 weeks for touch up if needed.\\n\\n Patient agrees with plan and verbalizes understanding.
Post-Care Instructions: Instructed to not lie down for 4 hours, do not massage areas injected, avoid headbands/hats if forehead was treated, and limit physical activity and extreme heat for 24 hours.
Forehead Units: 10
Glabellar Complex Units: 22
Price (Use Numbers Only, No Special Characters Or $): 908
Expiration Date (Month Year): 03/2027
Additional Area 6 Location: Prachi lift
Lot #: [id number]
Consent obtained through EMA charting. Risks include but not limited to lid/brow ptosis, bruising, swelling, diplopia, temporary effect, incomplete chemical denervation.\\n\\nBotox administered per written protocol per Melodie Carrizales MD.\\n\\nPhotos taken prior to injections.
Additional Area 4 Location: Upper arturo-oral rhytids
Additional Area 5 Location: Infraorbital eyelids
Incrementing Botox Units: By 0.5 Units
Dilution (U/0.1 Cc): 4
Additional Area 2 Location: Lower crows feet
Detail Level: Detailed
Additional Area 3 Location: Chin
Additional Area 1 Location: Brow lift

## 2025-04-02 ENCOUNTER — APPOINTMENT (OUTPATIENT)
Dept: URBAN - NONMETROPOLITAN AREA CLINIC 13 | Facility: CLINIC | Age: 60
Setting detail: DERMATOLOGY
End: 2025-04-02

## 2025-04-02 DIAGNOSIS — Z41.9 ENCOUNTER FOR PROCEDURE FOR PURPOSES OTHER THAN REMEDYING HEALTH STATE, UNSPECIFIED: ICD-10-CM

## 2025-04-02 PROCEDURE — ? BOTOX

## 2025-04-02 ASSESSMENT — LOCATION SIMPLE DESCRIPTION DERM
LOCATION SIMPLE: RIGHT EYEBROW
LOCATION SIMPLE: LEFT EYEBROW
LOCATION SIMPLE: LEFT FOREHEAD
LOCATION SIMPLE: RIGHT FOREHEAD
LOCATION SIMPLE: GLABELLA

## 2025-04-02 ASSESSMENT — LOCATION DETAILED DESCRIPTION DERM
LOCATION DETAILED: GLABELLA
LOCATION DETAILED: LEFT CENTRAL EYEBROW
LOCATION DETAILED: RIGHT CENTRAL EYEBROW
LOCATION DETAILED: RIGHT LATERAL FOREHEAD
LOCATION DETAILED: RIGHT MEDIAL EYEBROW
LOCATION DETAILED: RIGHT SUPERIOR MEDIAL FOREHEAD
LOCATION DETAILED: LEFT FOREHEAD
LOCATION DETAILED: LEFT MEDIAL FOREHEAD
LOCATION DETAILED: LEFT MEDIAL EYEBROW
LOCATION DETAILED: LEFT SUPERIOR FOREHEAD

## 2025-04-02 ASSESSMENT — LOCATION ZONE DERM: LOCATION ZONE: FACE

## 2025-06-09 ENCOUNTER — APPOINTMENT (OUTPATIENT)
Dept: URBAN - NONMETROPOLITAN AREA CLINIC 13 | Facility: CLINIC | Age: 60
Setting detail: DERMATOLOGY
End: 2025-06-09

## 2025-06-09 DIAGNOSIS — L57.8 OTHER SKIN CHANGES DUE TO CHRONIC EXPOSURE TO NONIONIZING RADIATION: ICD-10-CM

## 2025-06-09 DIAGNOSIS — L82.0 INFLAMED SEBORRHEIC KERATOSIS: ICD-10-CM

## 2025-06-09 PROCEDURE — ? COUNSELING

## 2025-06-09 PROCEDURE — ? LIQUID NITROGEN

## 2025-06-09 ASSESSMENT — LOCATION SIMPLE DESCRIPTION DERM
LOCATION SIMPLE: LEFT CLAVICULAR SKIN
LOCATION SIMPLE: CHEST

## 2025-06-09 ASSESSMENT — LOCATION DETAILED DESCRIPTION DERM
LOCATION DETAILED: UPPER STERNUM
LOCATION DETAILED: MIDDLE STERNUM
LOCATION DETAILED: LEFT CLAVICULAR SKIN

## 2025-06-09 ASSESSMENT — LOCATION ZONE DERM: LOCATION ZONE: TRUNK

## 2025-06-11 ENCOUNTER — RX ONLY (RX ONLY)
Age: 60
End: 2025-06-11

## 2025-06-11 RX ORDER — METRONIDAZOLE 7.5 MG/G
CREAM TOPICAL
Qty: 45 | Refills: 2 | Status: ERX | COMMUNITY
Start: 2025-06-11

## 2025-07-07 ENCOUNTER — APPOINTMENT (OUTPATIENT)
Dept: URBAN - NONMETROPOLITAN AREA CLINIC 13 | Facility: CLINIC | Age: 60
Setting detail: DERMATOLOGY
End: 2025-07-07

## 2025-07-07 DIAGNOSIS — L71.0 PERIORAL DERMATITIS: ICD-10-CM

## 2025-07-07 DIAGNOSIS — L57.0 ACTINIC KERATOSIS: ICD-10-CM

## 2025-07-07 DIAGNOSIS — L57.8 OTHER SKIN CHANGES DUE TO CHRONIC EXPOSURE TO NONIONIZING RADIATION: ICD-10-CM

## 2025-07-07 DIAGNOSIS — D22 MELANOCYTIC NEVI: ICD-10-CM

## 2025-07-07 DIAGNOSIS — D18.0 HEMANGIOMA: ICD-10-CM

## 2025-07-07 PROBLEM — D22.5 MELANOCYTIC NEVI OF TRUNK: Status: ACTIVE | Noted: 2025-07-07

## 2025-07-07 PROBLEM — D48.5 NEOPLASM OF UNCERTAIN BEHAVIOR OF SKIN: Status: ACTIVE | Noted: 2025-07-07

## 2025-07-07 PROBLEM — D18.01 HEMANGIOMA OF SKIN AND SUBCUTANEOUS TISSUE: Status: ACTIVE | Noted: 2025-07-07

## 2025-07-07 PROCEDURE — ? COUNSELING

## 2025-07-07 PROCEDURE — ? SUNSCREEN RECOMMENDATIONS

## 2025-07-07 PROCEDURE — ? PRESCRIPTION MEDICATION MANAGEMENT

## 2025-07-07 PROCEDURE — ? LIQUID NITROGEN

## 2025-07-07 PROCEDURE — ? BIOPSY BY SHAVE METHOD

## 2025-07-07 ASSESSMENT — LOCATION ZONE DERM
LOCATION ZONE: TRUNK
LOCATION ZONE: NOSE
LOCATION ZONE: FACE

## 2025-07-07 ASSESSMENT — LOCATION SIMPLE DESCRIPTION DERM
LOCATION SIMPLE: RIGHT CHEEK
LOCATION SIMPLE: LEFT UPPER BACK
LOCATION SIMPLE: CHEST
LOCATION SIMPLE: RIGHT BACK
LOCATION SIMPLE: LEFT FOREHEAD
LOCATION SIMPLE: LEFT NOSE

## 2025-07-07 ASSESSMENT — LOCATION DETAILED DESCRIPTION DERM
LOCATION DETAILED: RIGHT SUPERIOR MEDIAL BUCCAL CHEEK
LOCATION DETAILED: RIGHT SUPERIOR LATERAL UPPER BACK
LOCATION DETAILED: LEFT NASAL ALAR GROOVE
LOCATION DETAILED: LEFT MEDIAL SUPERIOR CHEST
LOCATION DETAILED: LEFT SUPERIOR UPPER BACK
LOCATION DETAILED: LEFT INFERIOR FOREHEAD

## 2025-07-07 NOTE — PROCEDURE: LIQUID NITROGEN
Render Post-Care Instructions In Note?: no
Show Aperture Variable?: Yes
Detail Level: Detailed
Consent: The patient's consent was obtained including but not limited to risks of crusting, scabbing, blistering, scarring, darker or lighter pigmentary change, recurrence, incomplete removal and infection.
Duration Of Freeze Thaw-Cycle (Seconds): 2
Post-Care Instructions: I reviewed with the patient in detail post-care instructions. Patient is to wear sunprotection, and avoid picking at any of the treated lesions. Pt may apply Vaseline to crusted or scabbing areas.

## 2025-07-07 NOTE — PROCEDURE: PRESCRIPTION MEDICATION MANAGEMENT
Samples Given: Tacrolimus 0.1/ NIACINAMIDE 4% CREAM - AAA QD
Detail Level: Zone
Render In Strict Bullet Format?: No

## 2025-07-29 ENCOUNTER — APPOINTMENT (OUTPATIENT)
Dept: URBAN - NONMETROPOLITAN AREA CLINIC 13 | Facility: CLINIC | Age: 60
Setting detail: DERMATOLOGY
End: 2025-07-29

## 2025-07-29 DIAGNOSIS — Z41.9 ENCOUNTER FOR PROCEDURE FOR PURPOSES OTHER THAN REMEDYING HEALTH STATE, UNSPECIFIED: ICD-10-CM

## 2025-07-29 PROCEDURE — ? BOTOX

## 2025-07-29 ASSESSMENT — LOCATION DETAILED DESCRIPTION DERM
LOCATION DETAILED: RIGHT LATERAL FOREHEAD
LOCATION DETAILED: RIGHT MEDIAL EYEBROW
LOCATION DETAILED: RIGHT SUPERIOR FOREHEAD
LOCATION DETAILED: LEFT CENTRAL EYEBROW
LOCATION DETAILED: LEFT MEDIAL FOREHEAD
LOCATION DETAILED: LEFT SUPERIOR FOREHEAD
LOCATION DETAILED: LEFT MEDIAL EYEBROW
LOCATION DETAILED: GLABELLA
LOCATION DETAILED: RIGHT CENTRAL EYEBROW
LOCATION DETAILED: LEFT LATERAL FOREHEAD

## 2025-07-29 ASSESSMENT — LOCATION ZONE DERM: LOCATION ZONE: FACE

## 2025-07-29 ASSESSMENT — LOCATION SIMPLE DESCRIPTION DERM
LOCATION SIMPLE: RIGHT FOREHEAD
LOCATION SIMPLE: RIGHT EYEBROW
LOCATION SIMPLE: GLABELLA
LOCATION SIMPLE: LEFT EYEBROW
LOCATION SIMPLE: LEFT FOREHEAD